# Patient Record
Sex: FEMALE | Race: WHITE | NOT HISPANIC OR LATINO | Employment: FULL TIME | URBAN - METROPOLITAN AREA
[De-identification: names, ages, dates, MRNs, and addresses within clinical notes are randomized per-mention and may not be internally consistent; named-entity substitution may affect disease eponyms.]

---

## 2017-11-20 ENCOUNTER — OFFICE VISIT (OUTPATIENT)
Dept: URGENT CARE | Facility: CLINIC | Age: 52
End: 2017-11-20
Payer: COMMERCIAL

## 2017-11-20 VITALS
OXYGEN SATURATION: 100 % | WEIGHT: 190 LBS | HEART RATE: 57 BPM | TEMPERATURE: 99 F | SYSTOLIC BLOOD PRESSURE: 110 MMHG | DIASTOLIC BLOOD PRESSURE: 64 MMHG | HEIGHT: 67 IN | BODY MASS INDEX: 29.82 KG/M2 | RESPIRATION RATE: 19 BRPM

## 2017-11-20 DIAGNOSIS — J01.40 ACUTE PANSINUSITIS, RECURRENCE NOT SPECIFIED: Primary | ICD-10-CM

## 2017-11-20 PROCEDURE — 99203 OFFICE O/P NEW LOW 30 MIN: CPT | Mod: 25,S$GLB,, | Performed by: FAMILY MEDICINE

## 2017-11-20 PROCEDURE — 96372 THER/PROPH/DIAG INJ SC/IM: CPT | Mod: S$GLB,,, | Performed by: FAMILY MEDICINE

## 2017-11-20 RX ORDER — BETAMETHASONE SODIUM PHOSPHATE AND BETAMETHASONE ACETATE 3; 3 MG/ML; MG/ML
6 INJECTION, SUSPENSION INTRA-ARTICULAR; INTRALESIONAL; INTRAMUSCULAR; SOFT TISSUE
Status: COMPLETED | OUTPATIENT
Start: 2017-11-20 | End: 2017-11-20

## 2017-11-20 RX ORDER — AMOXICILLIN AND CLAVULANATE POTASSIUM 875; 125 MG/1; MG/1
1 TABLET, FILM COATED ORAL 2 TIMES DAILY
Qty: 20 TABLET | Refills: 0 | Status: SHIPPED | OUTPATIENT
Start: 2017-11-20 | End: 2017-11-30

## 2017-11-20 RX ADMIN — BETAMETHASONE SODIUM PHOSPHATE AND BETAMETHASONE ACETATE 6 MG: 3; 3 INJECTION, SUSPENSION INTRA-ARTICULAR; INTRALESIONAL; INTRAMUSCULAR; SOFT TISSUE at 07:11

## 2017-11-21 NOTE — PATIENT INSTRUCTIONS
Consider adding over-the-counter PROBIOTICS to decrease some side-effects associated with antibiotics. When a person takes antibiotics, both the harmful bacteria and the beneficial bacteria are killed. A reduction of beneficial bacteria can lead to digestive problems, such as diarrhea, yeast infections and urinary tract infections.      Sinusitis (Antibiotic Treatment)    The sinuses are air-filled spaces within the bones of the face. They connect to the inside of the nose. Sinusitis is an inflammation of the tissue lining the sinus cavity. Sinus inflammation can occur during a cold. It can also be due to allergies to pollens and other particles in the air. Sinusitis can cause symptoms of sinus congestion and fullness. A sinus infection causes fever, headache and facial pain. There is often green or yellow drainage from the nose or into the back of the throat (post-nasal drip). You have been given antibiotics to treat this condition.  Home care:  · Take the full course of antibiotics as instructed. Do not stop taking them, even if you feel better.  · Drink plenty of water, hot tea, and other liquids. This may help thin mucus. It also may promote sinus drainage.  · Heat may help soothe painful areas of the face. Use a towel soaked in hot water. Or,  the shower and direct the hot spray onto your face. Using a vaporizer along with a menthol rub at night may also help.   · An expectorant containing guaifenesin may help thin the mucus and promote drainage from the sinuses.  · Over-the-counter decongestants may be used unless a similar medicine was prescribed. Nasal sprays work the fastest. Use one that contains phenylephrine or oxymetazoline. First blow the nose gently. Then use the spray. Do not use these medicines more often than directed on the label or symptoms may get worse. You may also use tablets containing pseudoephedrine. Avoid products that combine ingredients, because side effects may be  increased. Read labels. You can also ask the pharmacist for help. (NOTE: Persons with high blood pressure should not use decongestants. They can raise blood pressure.)  · Over-the-counter antihistamines may help if allergies contributed to your sinusitis.    · Do not use nasal rinses or irrigation during an acute sinus infection, unless told to by your health care provider. Rinsing may spread the infection to other sinuses.  · Use acetaminophen or ibuprofen to control pain, unless another pain medicine was prescribed. (If you have chronic liver or kidney disease or ever had a stomach ulcer, talk with your doctor before using these medicines. Aspirin should never be used in anyone under 18 years of age who is ill with a fever. It may cause severe liver damage.)  · Don't smoke. This can worsen symptoms.  Follow-up care  Follow up with your healthcare provider or our staff if you are not improving within the next week.  When to seek medical advice  Call your healthcare provider if any of these occur:  · Facial pain or headache becoming more severe  · Stiff neck  · Unusual drowsiness or confusion  · Swelling of the forehead or eyelids  · Vision problems, including blurred or double vision  · Fever of 100.4ºF (38ºC) or higher, or as directed by your healthcare provider  · Seizure  · Breathing problems  · Symptoms not resolving within 10 days  Date Last Reviewed: 4/13/2015  © 6516-9690 [employer]. [address] [place] [address], [place] [address]. All rights reserved. This information is not intended as a substitute for professional medical care. Always follow your healthcare professional's instructions.

## 2017-11-21 NOTE — PROGRESS NOTES
"Subjective:       Patient ID: Mikaela Oneil is a 52 y.o. female.    Vitals:  height is 5' 7" (1.702 m) and weight is 86.2 kg (190 lb). Her temperature is 98.9 °F (37.2 °C). Her blood pressure is 110/64 and her pulse is 57 (abnormal). Her respiration is 19 and oxygen saturation is 100%.     Chief Complaint: Sore Throat    Sore Throat    This is a new problem. The current episode started today. The problem has been gradually worsening. There has been no fever. The pain is at a severity of 3/10. The pain is mild. Associated symptoms include ear pain, headaches and a hoarse voice. Pertinent negatives include no abdominal pain, congestion, coughing or shortness of breath. She has tried nothing for the symptoms. The treatment provided no relief.     Review of Systems   Constitution: Positive for chills and fever. Negative for malaise/fatigue.   HENT: Positive for ear pain, hoarse voice and sore throat. Negative for congestion.    Eyes: Negative for discharge and redness.   Cardiovascular: Negative for chest pain, dyspnea on exertion and leg swelling.   Respiratory: Negative for cough, shortness of breath, sputum production and wheezing.    Musculoskeletal: Negative for myalgias.   Gastrointestinal: Negative for abdominal pain and nausea.   Neurological: Positive for headaches.       Objective:      Physical Exam   Constitutional: She is oriented to person, place, and time. She appears well-developed and well-nourished.   HENT:   Head: Normocephalic and atraumatic.   Right Ear: External ear normal.   Left Ear: External ear normal.   Nose: Mucosal edema, rhinorrhea and sinus tenderness present. Right sinus exhibits maxillary sinus tenderness and frontal sinus tenderness. Left sinus exhibits maxillary sinus tenderness and frontal sinus tenderness.   Mouth/Throat: Oropharyngeal exudate and posterior oropharyngeal erythema present.   Eyes: Conjunctivae and EOM are normal. Pupils are equal, round, and reactive to light.   Neck: " Normal range of motion.   Cardiovascular: Normal rate, regular rhythm and normal heart sounds.    Pulmonary/Chest: Effort normal and breath sounds normal. She has no wheezes. She has no rales.   Neurological: She is alert and oriented to person, place, and time.       Assessment:       1. Acute pansinusitis, recurrence not specified        Plan:         Acute pansinusitis, recurrence not specified  -     betamethasone acetate-betamethasone sodium phosphate injection 6 mg; Inject 1 mL (6 mg total) into the muscle one time.  -     amoxicillin-clavulanate 875-125mg (AUGMENTIN) 875-125 mg per tablet; Take 1 tablet by mouth 2 (two) times daily.  Dispense: 20 tablet; Refill: 0  -     (Magic mouthwash) 1:1:1 Benadryl 12.5mg/5ml liq, aluminum & magnesium hydroxide-simehticone (Maalox), lidocaine viscous 2%; Swish and spit 10 mLs every 4 (four) hours as needed. for mouth sores  Dispense: 180 mL; Refill: 0      Patient Instructions         Consider adding over-the-counter PROBIOTICS to decrease some side-effects associated with antibiotics. When a person takes antibiotics, both the harmful bacteria and the beneficial bacteria are killed. A reduction of beneficial bacteria can lead to digestive problems, such as diarrhea, yeast infections and urinary tract infections.      Sinusitis (Antibiotic Treatment)    The sinuses are air-filled spaces within the bones of the face. They connect to the inside of the nose. Sinusitis is an inflammation of the tissue lining the sinus cavity. Sinus inflammation can occur during a cold. It can also be due to allergies to pollens and other particles in the air. Sinusitis can cause symptoms of sinus congestion and fullness. A sinus infection causes fever, headache and facial pain. There is often green or yellow drainage from the nose or into the back of the throat (post-nasal drip). You have been given antibiotics to treat this condition.  Home care:  · Take the full course of antibiotics as  instructed. Do not stop taking them, even if you feel better.  · Drink plenty of water, hot tea, and other liquids. This may help thin mucus. It also may promote sinus drainage.  · Heat may help soothe painful areas of the face. Use a towel soaked in hot water. Or,  the shower and direct the hot spray onto your face. Using a vaporizer along with a menthol rub at night may also help.   · An expectorant containing guaifenesin may help thin the mucus and promote drainage from the sinuses.  · Over-the-counter decongestants may be used unless a similar medicine was prescribed. Nasal sprays work the fastest. Use one that contains phenylephrine or oxymetazoline. First blow the nose gently. Then use the spray. Do not use these medicines more often than directed on the label or symptoms may get worse. You may also use tablets containing pseudoephedrine. Avoid products that combine ingredients, because side effects may be increased. Read labels. You can also ask the pharmacist for help. (NOTE: Persons with high blood pressure should not use decongestants. They can raise blood pressure.)  · Over-the-counter antihistamines may help if allergies contributed to your sinusitis.    · Do not use nasal rinses or irrigation during an acute sinus infection, unless told to by your health care provider. Rinsing may spread the infection to other sinuses.  · Use acetaminophen or ibuprofen to control pain, unless another pain medicine was prescribed. (If you have chronic liver or kidney disease or ever had a stomach ulcer, talk with your doctor before using these medicines. Aspirin should never be used in anyone under 18 years of age who is ill with a fever. It may cause severe liver damage.)  · Don't smoke. This can worsen symptoms.  Follow-up care  Follow up with your healthcare provider or our staff if you are not improving within the next week.  When to seek medical advice  Call your healthcare provider if any of these  occur:  · Facial pain or headache becoming more severe  · Stiff neck  · Unusual drowsiness or confusion  · Swelling of the forehead or eyelids  · Vision problems, including blurred or double vision  · Fever of 100.4ºF (38ºC) or higher, or as directed by your healthcare provider  · Seizure  · Breathing problems  · Symptoms not resolving within 10 days  Date Last Reviewed: 4/13/2015  © 7579-6068 [employer]. [address] [place] [address], [place] [address]. All rights reserved. This information is not intended as a substitute for professional medical care. Always follow your healthcare professional's instructions.

## 2018-01-01 ENCOUNTER — OFFICE VISIT (OUTPATIENT)
Dept: URGENT CARE | Facility: CLINIC | Age: 53
End: 2018-01-01
Payer: COMMERCIAL

## 2018-01-01 VITALS
TEMPERATURE: 98 F | HEIGHT: 67 IN | SYSTOLIC BLOOD PRESSURE: 95 MMHG | HEART RATE: 63 BPM | DIASTOLIC BLOOD PRESSURE: 50 MMHG | WEIGHT: 190 LBS | BODY MASS INDEX: 29.82 KG/M2 | OXYGEN SATURATION: 97 %

## 2018-01-01 DIAGNOSIS — B34.9 VIRAL SYNDROME: Primary | ICD-10-CM

## 2018-01-01 LAB
CTP QC/QA: YES
FLUAV AG NPH QL: NEGATIVE
FLUBV AG NPH QL: NEGATIVE

## 2018-01-01 PROCEDURE — 87804 INFLUENZA ASSAY W/OPTIC: CPT | Mod: QW,S$GLB,, | Performed by: PHYSICIAN ASSISTANT

## 2018-01-01 PROCEDURE — 99214 OFFICE O/P EST MOD 30 MIN: CPT | Mod: 25,S$GLB,, | Performed by: PHYSICIAN ASSISTANT

## 2018-01-01 PROCEDURE — 96372 THER/PROPH/DIAG INJ SC/IM: CPT | Mod: S$GLB,,, | Performed by: EMERGENCY MEDICINE

## 2018-01-01 RX ORDER — DEXAMETHASONE SODIUM PHOSPHATE 100 MG/10ML
8 INJECTION INTRAMUSCULAR; INTRAVENOUS
Status: COMPLETED | OUTPATIENT
Start: 2018-01-01 | End: 2018-01-01

## 2018-01-01 RX ORDER — BENZONATATE 100 MG/1
100 CAPSULE ORAL 3 TIMES DAILY PRN
Qty: 30 CAPSULE | Refills: 0 | Status: SHIPPED | OUTPATIENT
Start: 2018-01-01 | End: 2019-01-01

## 2018-01-01 RX ADMIN — DEXAMETHASONE SODIUM PHOSPHATE 8 MG: 100 INJECTION INTRAMUSCULAR; INTRAVENOUS at 04:01

## 2018-01-01 NOTE — PROGRESS NOTES
"Subjective:       Patient ID: Mikaela Oneil is a 52 y.o. female.    Vitals:  height is 5' 7" (1.702 m) and weight is 86.2 kg (190 lb). Her temperature is 98 °F (36.7 °C). Her blood pressure is 95/50 (abnormal) and her pulse is 63. Her oxygen saturation is 97%.     Chief Complaint: URI    URI    This is a new problem. The current episode started yesterday. The problem has been unchanged. There has been no fever. Associated symptoms include coughing, headaches and rhinorrhea. Pertinent negatives include no abdominal pain, chest pain, congestion, ear pain, nausea, sore throat or wheezing. She has tried nothing for the symptoms.     Review of Systems   Constitution: Negative for chills, fever and malaise/fatigue.   HENT: Positive for hoarse voice and rhinorrhea. Negative for congestion, ear pain and sore throat.    Eyes: Negative for discharge and redness.   Cardiovascular: Negative for chest pain, dyspnea on exertion and leg swelling.   Respiratory: Positive for cough and sputum production. Negative for shortness of breath and wheezing.    Musculoskeletal: Negative for myalgias.   Gastrointestinal: Negative for abdominal pain and nausea.   Neurological: Positive for headaches.       Objective:      Physical Exam   Constitutional: She is oriented to person, place, and time. Vital signs are normal. She appears well-developed and well-nourished. She appears ill. No distress.   HENT:   Head: Normocephalic and atraumatic.   Right Ear: Hearing, tympanic membrane, external ear and ear canal normal.   Left Ear: Hearing, tympanic membrane, external ear and ear canal normal.   Nose: Mucosal edema present. Right sinus exhibits no maxillary sinus tenderness and no frontal sinus tenderness. Left sinus exhibits no maxillary sinus tenderness and no frontal sinus tenderness.   Mouth/Throat: Uvula is midline. Posterior oropharyngeal erythema present. No oropharyngeal exudate or posterior oropharyngeal edema.   Eyes: Conjunctivae, EOM " and lids are normal. Right eye exhibits no discharge. Left eye exhibits no discharge.   Neck: Normal range of motion. Neck supple.   Cardiovascular: Normal rate, regular rhythm and normal heart sounds.  Exam reveals no gallop and no friction rub.    No murmur heard.  Pulmonary/Chest: Effort normal and breath sounds normal. No respiratory distress. She has no decreased breath sounds. She has no wheezes. She has no rhonchi. She has no rales.   Musculoskeletal: Normal range of motion.   Lymphadenopathy:        Head (right side): No submandibular and no tonsillar adenopathy present.        Head (left side): No submandibular and no tonsillar adenopathy present.   Neurological: She is alert and oriented to person, place, and time.   Skin: Skin is warm and dry. No rash noted. No erythema.   Psychiatric: She has a normal mood and affect. Her behavior is normal.   Nursing note and vitals reviewed.      Assessment:       1. Viral syndrome        Office Visit on 01/01/2018   Component Date Value Ref Range Status    Rapid Influenza A Ag 01/01/2018 Negative  Negative Final    Rapid Influenza B Ag 01/01/2018 Negative  Negative Final     Acceptable 01/01/2018 Yes   Final     Plan:         Viral syndrome  -     POCT Influenza A/B  -     dexamethasone injection 8 mg; Inject 0.8 mLs (8 mg total) into the muscle one time.  -     benzonatate (TESSALON PERLES) 100 MG capsule; Take 1 capsule (100 mg total) by mouth 3 (three) times daily as needed for Cough.  Dispense: 30 capsule; Refill: 0      Patient Instructions     You have been diagnosed with a viral syndrome. Viral syndromes are self-limited and usually resolve within 10 days from onset of symptoms. Antibiotics are not effective against viral infections. It is important that you get lots of rest and drink plenty of fluids. Treatment is through symptomatic relief.    Below are suggestions for symptomatic relief:   -Tylenol every 4 hours OR ibuprofen every 6 hours  as needed for pain/fever.   -Salt water gargles to soothe throat pain.   -Chloroseptic spray also helps to numb throat pain.   -Nasal saline spray reduces inflammation and dryness.   -Warm face compresses to help with facial sinus pain/pressure.   -Vicks vapor rub at night.   -Flonase OTC or Nasacort OTC for nasal congestion.   -Simple foods like chicken noodle soup.   -Delsym helps with coughing at night   -Zyrtec/Claritin during the day & Benadryl at night may help with allergies.     If you DO NOT have Hypertension or any history of palpitations, it is ok to take over the counter Sudafed or Mucinex D or Allegra-D or Claritin-D or Zyrtec-D.  If you do take one of the above, it is ok to combine that with plain over the counter Mucinex or Allegra or Claritin or Zyrtec. If, for example, you are taking Zyrtec -D, you can combine that with Mucinex, but not Mucinex-D.  If you are taking Mucinex-D, you can combine that with plain Allegra or Claritin or Zyrtec.   If you DO have Hypertension or palpitations, it is safe to take Coricidin HBP for relief of sinus symptoms.    Please follow up with your primary care provider within 2-5 days if your signs and symptoms have not resolved or worsen.     If your condition worsens or fails to improve we recommend that you receive another evaluation at the emergency room immediately or contact your primary medical clinic to discuss your concerns.   You must understand that you have received an Urgent Care treatment only and that you may be released before all of your medical problems are known or treated. You, the patient, will arrange for follow up care as instructed.           Self-Care for Sinusitis     Drinking plenty of water can help sinuses drain.   Sinusitis can often be managed with self-care. Self-care can keep sinuses moist and make you feel more comfortable. Remember to follow your doctor's instructions closely. This can make a big difference in getting your sinus problem  under control.  Drink fluids  Drinking extra fluids helps thin your mucus. This lets it drain from your sinuses more easily. Have a glass of water every hour or two. A humidifier helps in much the same way. Fluids can also offset the drying effects of certain medicines. If you use a humidifier, follow the product maker's instructions on how to use it. Clean it on a regular schedule.  Use saltwater rinses  Rinses help keep your sinuses and nose moist. Mix a teaspoon of salt in 8 ounces of fresh, warm water. Use a bulb syringe to gently squirt the water into your nose a few times a day. You can also buy ready-made saline nasal sprays.  Apply hot or cold packs  Applying heat to the area surrounding your sinuses may make you feel more comfortable. Use a hot water bottle or a hand towel dipped in hot water. Some people also find ice packs effective for relieving pain.  Medicines  Your doctor may prescribe medications to help treat your sinusitis. If you have an infection, antibiotics can help clear it up. If you are prescribed antibiotics, take all pills on schedule until they are gone, even if you feel better. Decongestants help relieve swelling. Use decongestant sprays for short periods only under the direction of your doctor. If you have allergies, your doctor may prescribe medications to help relieve them.   Date Last Reviewed: 10/1/2016  © 2673-4855 [employer]. [address] [place] [address], [place] [address]. All rights reserved. This information is not intended as a substitute for professional medical care. Always follow your healthcare professional's instructions.

## 2018-01-01 NOTE — PATIENT INSTRUCTIONS
You have been diagnosed with a viral syndrome. Viral syndromes are self-limited and usually resolve within 10 days from onset of symptoms. Antibiotics are not effective against viral infections. It is important that you get lots of rest and drink plenty of fluids. Treatment is through symptomatic relief.    Below are suggestions for symptomatic relief:   -Tylenol every 4 hours OR ibuprofen every 6 hours as needed for pain/fever.   -Salt water gargles to soothe throat pain.   -Chloroseptic spray also helps to numb throat pain.   -Nasal saline spray reduces inflammation and dryness.   -Warm face compresses to help with facial sinus pain/pressure.   -Vicks vapor rub at night.   -Flonase OTC or Nasacort OTC for nasal congestion.   -Simple foods like chicken noodle soup.   -Delsym helps with coughing at night   -Zyrtec/Claritin during the day & Benadryl at night may help with allergies.     If you DO NOT have Hypertension or any history of palpitations, it is ok to take over the counter Sudafed or Mucinex D or Allegra-D or Claritin-D or Zyrtec-D.  If you do take one of the above, it is ok to combine that with plain over the counter Mucinex or Allegra or Claritin or Zyrtec. If, for example, you are taking Zyrtec -D, you can combine that with Mucinex, but not Mucinex-D.  If you are taking Mucinex-D, you can combine that with plain Allegra or Claritin or Zyrtec.   If you DO have Hypertension or palpitations, it is safe to take Coricidin HBP for relief of sinus symptoms.    Please follow up with your primary care provider within 2-5 days if your signs and symptoms have not resolved or worsen.     If your condition worsens or fails to improve we recommend that you receive another evaluation at the emergency room immediately or contact your primary medical clinic to discuss your concerns.   You must understand that you have received an Urgent Care treatment only and that you may be released before all of your medical problems  are known or treated. You, the patient, will arrange for follow up care as instructed.           Self-Care for Sinusitis     Drinking plenty of water can help sinuses drain.   Sinusitis can often be managed with self-care. Self-care can keep sinuses moist and make you feel more comfortable. Remember to follow your doctor's instructions closely. This can make a big difference in getting your sinus problem under control.  Drink fluids  Drinking extra fluids helps thin your mucus. This lets it drain from your sinuses more easily. Have a glass of water every hour or two. A humidifier helps in much the same way. Fluids can also offset the drying effects of certain medicines. If you use a humidifier, follow the product maker's instructions on how to use it. Clean it on a regular schedule.  Use saltwater rinses  Rinses help keep your sinuses and nose moist. Mix a teaspoon of salt in 8 ounces of fresh, warm water. Use a bulb syringe to gently squirt the water into your nose a few times a day. You can also buy ready-made saline nasal sprays.  Apply hot or cold packs  Applying heat to the area surrounding your sinuses may make you feel more comfortable. Use a hot water bottle or a hand towel dipped in hot water. Some people also find ice packs effective for relieving pain.  Medicines  Your doctor may prescribe medications to help treat your sinusitis. If you have an infection, antibiotics can help clear it up. If you are prescribed antibiotics, take all pills on schedule until they are gone, even if you feel better. Decongestants help relieve swelling. Use decongestant sprays for short periods only under the direction of your doctor. If you have allergies, your doctor may prescribe medications to help relieve them.   Date Last Reviewed: 10/1/2016  © 6253-0514 [employer]. [address] [place] [address], [place] [address]. All rights reserved. This information is not intended as a substitute for professional medical  care. Always follow your healthcare professional's instructions.

## 2018-09-05 ENCOUNTER — OFFICE VISIT (OUTPATIENT)
Dept: URGENT CARE | Facility: CLINIC | Age: 53
End: 2018-09-05
Payer: COMMERCIAL

## 2018-09-05 VITALS
TEMPERATURE: 101 F | HEIGHT: 67 IN | DIASTOLIC BLOOD PRESSURE: 77 MMHG | HEART RATE: 89 BPM | OXYGEN SATURATION: 99 % | RESPIRATION RATE: 20 BRPM | WEIGHT: 185 LBS | BODY MASS INDEX: 29.03 KG/M2 | SYSTOLIC BLOOD PRESSURE: 124 MMHG

## 2018-09-05 DIAGNOSIS — J01.10 ACUTE NON-RECURRENT FRONTAL SINUSITIS: ICD-10-CM

## 2018-09-05 DIAGNOSIS — J02.9 SORE THROAT: ICD-10-CM

## 2018-09-05 DIAGNOSIS — J03.90 TONSILLITIS: Primary | ICD-10-CM

## 2018-09-05 DIAGNOSIS — R50.9 FEVER, UNSPECIFIED FEVER CAUSE: ICD-10-CM

## 2018-09-05 LAB
CTP QC/QA: YES
CTP QC/QA: YES
FLUAV AG NPH QL: NEGATIVE
FLUBV AG NPH QL: NEGATIVE
S PYO RRNA THROAT QL PROBE: NEGATIVE

## 2018-09-05 PROCEDURE — 99214 OFFICE O/P EST MOD 30 MIN: CPT | Mod: S$GLB,,, | Performed by: NURSE PRACTITIONER

## 2018-09-05 PROCEDURE — 87880 STREP A ASSAY W/OPTIC: CPT | Mod: QW,S$GLB,, | Performed by: NURSE PRACTITIONER

## 2018-09-05 PROCEDURE — 87804 INFLUENZA ASSAY W/OPTIC: CPT | Mod: QW,S$GLB,, | Performed by: NURSE PRACTITIONER

## 2018-09-05 RX ORDER — PREDNISONE 20 MG/1
40 TABLET ORAL DAILY
Qty: 10 TABLET | Refills: 0 | Status: SHIPPED | OUTPATIENT
Start: 2018-09-05 | End: 2018-09-10

## 2018-09-05 RX ORDER — AMOXICILLIN AND CLAVULANATE POTASSIUM 875; 125 MG/1; MG/1
1 TABLET, FILM COATED ORAL 2 TIMES DAILY
Qty: 20 TABLET | Refills: 0 | Status: SHIPPED | OUTPATIENT
Start: 2018-09-05 | End: 2018-11-29

## 2018-09-05 NOTE — LETTER
September 5, 2018      [facility] [address], [address]  [place] [address]  Phone: [phone]  Fax: [phone]       Patient: Mikaela Oenil   YOB: 1965  Date of Visit: 09/05/2018    To Whom It May Concern:    Ninfa Oneil  was at [facility] on 09/05/2018. She may return to work/school on 09/07/2018 with no restrictions. If you have any questions or concerns, or if I can be of further assistance, please do not hesitate to contact me.    Sincerely,        France Garza NP

## 2018-09-05 NOTE — PROGRESS NOTES
"Subjective:       Patient ID: Mikaela Oneil is a 53 y.o. female.    Vitals:  height is 5' 7" (1.702 m) and weight is 83.9 kg (185 lb). Her temperature is 101.4 °F (38.6 °C) (abnormal). Her blood pressure is 124/77 and her pulse is 89. Her respiration is 20 and oxygen saturation is 99%.     Chief Complaint: Fever; Chills; and Sore Throat    Pt has been having a sore throat, fever and chills since Sunday night. She has taken mucin ex and ibuprofen and that did help but her fever kept coming back.      Fever    This is a new problem. Episode onset: 4 days ago. The problem occurs constantly. The problem has been unchanged. The maximum temperature noted was 102 to 102.9 F. Associated symptoms include muscle aches and a sore throat. Pertinent negatives include no abdominal pain, chest pain, congestion, coughing, ear pain, headaches, nausea or wheezing. She has tried NSAIDs for the symptoms. The treatment provided mild relief.   Sore Throat    This is a new problem. Episode onset: 4 days ago. The problem has been unchanged. The maximum temperature recorded prior to her arrival was 102 - 102.9 F. The fever has been present for 1 to 2 days. Pertinent negatives include no abdominal pain, congestion, coughing, ear pain, headaches, hoarse voice or shortness of breath. She has tried NSAIDs for the symptoms. The treatment provided mild relief.     Review of Systems   Constitution: Positive for chills and fever. Negative for malaise/fatigue.   HENT: Positive for sore throat. Negative for congestion, ear pain and hoarse voice.    Eyes: Negative for discharge and redness.   Cardiovascular: Negative for chest pain, dyspnea on exertion and leg swelling.   Respiratory: Negative for cough, shortness of breath, sputum production and wheezing.    Musculoskeletal: Negative for myalgias.   Gastrointestinal: Negative for abdominal pain and nausea.   Neurological: Negative for headaches.       Objective:      Physical Exam   Constitutional: " She is oriented to person, place, and time. She appears well-developed and well-nourished. She is cooperative.  Non-toxic appearance. She does not appear ill. No distress.   HENT:   Head: Normocephalic and atraumatic.   Right Ear: Hearing, tympanic membrane, external ear and ear canal normal.   Left Ear: Hearing, tympanic membrane, external ear and ear canal normal.   Nose: No mucosal edema, rhinorrhea or nasal deformity. No epistaxis. Right sinus exhibits frontal sinus tenderness. Right sinus exhibits no maxillary sinus tenderness. Left sinus exhibits frontal sinus tenderness. Left sinus exhibits no maxillary sinus tenderness.   Mouth/Throat: Uvula is midline and mucous membranes are normal. No trismus in the jaw. Normal dentition. No dental abscesses or uvula swelling. Oropharyngeal exudate, posterior oropharyngeal edema and posterior oropharyngeal erythema present. No tonsillar abscesses. Tonsils are 3+ on the right. Tonsils are 3+ on the left. Tonsillar exudate.   Eyes: Conjunctivae and lids are normal. No scleral icterus.   Sclera clear bilat   Neck: Trachea normal, full passive range of motion without pain and phonation normal. Neck supple.   Cardiovascular: Normal rate, regular rhythm, normal heart sounds, intact distal pulses and normal pulses.   Pulmonary/Chest: Effort normal and breath sounds normal. No respiratory distress.   Abdominal: Soft. Normal appearance and bowel sounds are normal. She exhibits no distension. There is no tenderness.   Musculoskeletal: Normal range of motion. She exhibits no edema or deformity.   Lymphadenopathy:        Head (right side): Submandibular adenopathy present.        Head (left side): Submandibular (tender) adenopathy present.   Neurological: She is alert and oriented to person, place, and time. She exhibits normal muscle tone. Coordination normal.   Skin: Skin is warm, dry and intact. She is not diaphoretic. No pallor.   Psychiatric: She has a normal mood and affect.  Her speech is normal and behavior is normal. Judgment and thought content normal. Cognition and memory are normal.   Nursing note and vitals reviewed.        Results for orders placed or performed in visit on 09/05/18   POCT rapid strep A   Result Value Ref Range    Rapid Strep A Screen Negative Negative     Acceptable Yes    POCT Influenza A/B   Result Value Ref Range    Rapid Influenza A Ag Negative Negative    Rapid Influenza B Ag Negative Negative     Acceptable Yes        Assessment:       1. Tonsillitis    2. Acute non-recurrent frontal sinusitis    3. Fever, unspecified fever cause    4. Sore throat        Plan:         Dr. Bosch also evaluated patient and is in agreement with current plan of care and regimen.    Tonsillitis    Acute non-recurrent frontal sinusitis  -     predniSONE (DELTASONE) 20 MG tablet; Take 2 tablets (40 mg total) by mouth once daily. for 5 days  Dispense: 10 tablet; Refill: 0  -     amoxicillin-clavulanate 875-125mg (AUGMENTIN) 875-125 mg per tablet; Take 1 tablet by mouth 2 (two) times daily.  Dispense: 20 tablet; Refill: 0    Fever, unspecified fever cause  -     POCT Influenza A/B    Sore throat  -     POCT rapid strep A  -     (Magic mouthwash) 1:1:1 Benadryl 12.5mg/5ml liq, aluminum & magnesium hydroxide-simehticone (Maalox), lidocaine viscous 2%; Swish and spit 10 mLs every 4 (four) hours as needed. for mouth sores  Dispense: 200 mL; Refill: 0            Patient Instructions     If symptoms do not improve within 2-3 days of antibiotic therapy return to clinic. If you begin to have difficulty swallowing, pain more so on one side of throat, muffled voice or drooling go to ER immediately. Complete full course of antibiotic therapy.     Continue taking Motrin as needed for fevers and aches.     Please follow up with your Primary care provider within 2-5 days if your signs and symptoms have not resolved or worsen.     If your condition worsens or fails to  improve we recommend that you receive another evaluation at the emergency room immediately or contact your primary medical clinic to discuss your concerns.   You must understand that you have received an Urgent Care treatment only and that you may be released before all of your medical problems are known or treated. You, the patient, will arrange for follow up care as instructed.       Self-Care for Sore Throats    Sore throats happen for many reasons, such as colds, allergies, and infections caused by viruses or bacteria. In any case, your throat becomes red and sore. Your goal for self-care is to reduce your discomfort while giving your throat a chance to heal.  Moisten and soothe your throat  Tips include the following:  · Try a sip of water first thing after waking up.  · Keep your throat moist by drinking 6 or more glasses of clear liquids every day.  · Run a cool-air humidifier in your room overnight.  · Avoid cigarette smoke.   · Suck on throat lozenges, cough drops, hard candy, ice chips, or frozen fruit-juice bars. Use the sugar-free versions if your diet or medical condition requires them.  Gargle to ease irritation  Gargling every hour or 2 can ease irritation. Try gargling with 1 of these solutions:  · 1/4 teaspoon of salt in 1/2 cup of warm water  · An over-the-counter anesthetic gargle  Use medicine for more relief  Over-the-counter medicine can reduce sore throat symptoms. Ask your pharmacist if you have questions about which medicine to use:  · Ease pain with anesthetic sprays. Aspirin or an aspirin substitute also helps. Remember, never give aspirin to anyone 18 or younger, or if you are already taking blood thinners.   · For sore throats caused by allergies, try antihistamines to block the allergic reaction.  · Remember: unless a sore throat is caused by a bacterial infection, antibiotics wont help you.  Prevent future sore throats  Prevention tips include the following:  · Stop smoking or reduce  contact with secondhand smoke. Smoke irritates the tender throat lining.  · Limit contact with pets and with allergy-causing substances, such as pollen and mold.  · When youre around someone with a sore throat or cold, wash your hands often to keep viruses or bacteria from spreading.  · Dont strain your vocal cords.  Call your healthcare provider  Contact your healthcare provider if you have:  · A temperature over 101°F (38.3°C)  · White spots on the throat  · Great difficulty swallowing  · Trouble breathing  · A skin rash  · Recent exposure to someone else with strep bacteria  · Severe hoarseness and swollen glands in the neck or jaw   Date Last Reviewed: 8/1/2016  © 9224-1725 [employer]. [address] [place] [address], [place] [address]. All rights reserved. This information is not intended as a substitute for professional medical care. Always follow your healthcare professional's instructions.

## 2018-09-05 NOTE — PATIENT INSTRUCTIONS
If symptoms do not improve within 2-3 days of antibiotic therapy return to clinic. If you begin to have difficulty swallowing, pain more so on one side of throat, muffled voice or drooling go to ER immediately. Complete full course of antibiotic therapy.     Continue taking Motrin as needed for fevers and aches.     Please follow up with your Primary care provider within 2-5 days if your signs and symptoms have not resolved or worsen.     If your condition worsens or fails to improve we recommend that you receive another evaluation at the emergency room immediately or contact your primary medical clinic to discuss your concerns.   You must understand that you have received an Urgent Care treatment only and that you may be released before all of your medical problems are known or treated. You, the patient, will arrange for follow up care as instructed.       Self-Care for Sore Throats    Sore throats happen for many reasons, such as colds, allergies, and infections caused by viruses or bacteria. In any case, your throat becomes red and sore. Your goal for self-care is to reduce your discomfort while giving your throat a chance to heal.  Moisten and soothe your throat  Tips include the following:  · Try a sip of water first thing after waking up.  · Keep your throat moist by drinking 6 or more glasses of clear liquids every day.  · Run a cool-air humidifier in your room overnight.  · Avoid cigarette smoke.   · Suck on throat lozenges, cough drops, hard candy, ice chips, or frozen fruit-juice bars. Use the sugar-free versions if your diet or medical condition requires them.  Gargle to ease irritation  Gargling every hour or 2 can ease irritation. Try gargling with 1 of these solutions:  · 1/4 teaspoon of salt in 1/2 cup of warm water  · An over-the-counter anesthetic gargle  Use medicine for more relief  Over-the-counter medicine can reduce sore throat symptoms. Ask your pharmacist if you have questions about which  medicine to use:  · Ease pain with anesthetic sprays. Aspirin or an aspirin substitute also helps. Remember, never give aspirin to anyone 18 or younger, or if you are already taking blood thinners.   · For sore throats caused by allergies, try antihistamines to block the allergic reaction.  · Remember: unless a sore throat is caused by a bacterial infection, antibiotics wont help you.  Prevent future sore throats  Prevention tips include the following:  · Stop smoking or reduce contact with secondhand smoke. Smoke irritates the tender throat lining.  · Limit contact with pets and with allergy-causing substances, such as pollen and mold.  · When youre around someone with a sore throat or cold, wash your hands often to keep viruses or bacteria from spreading.  · Dont strain your vocal cords.  Call your healthcare provider  Contact your healthcare provider if you have:  · A temperature over 101°F (38.3°C)  · White spots on the throat  · Great difficulty swallowing  · Trouble breathing  · A skin rash  · Recent exposure to someone else with strep bacteria  · Severe hoarseness and swollen glands in the neck or jaw   Date Last Reviewed: 8/1/2016  © 7292-0519 [employer]. [address] [place] [address], [place] [address]. All rights reserved. This information is not intended as a substitute for professional medical care. Always follow your healthcare professional's instructions.

## 2018-11-29 ENCOUNTER — HOSPITAL ENCOUNTER (EMERGENCY)
Facility: HOSPITAL | Age: 53
Discharge: HOME | End: 2018-11-29
Attending: EMERGENCY MEDICINE
Payer: COMMERCIAL

## 2018-11-29 VITALS
BODY MASS INDEX: 27.32 KG/M2 | HEIGHT: 66 IN | HEART RATE: 56 BPM | TEMPERATURE: 99 F | RESPIRATION RATE: 13 BRPM | SYSTOLIC BLOOD PRESSURE: 137 MMHG | WEIGHT: 170 LBS | OXYGEN SATURATION: 100 % | DIASTOLIC BLOOD PRESSURE: 73 MMHG

## 2018-11-29 DIAGNOSIS — R07.89 CHEST WALL PAIN: ICD-10-CM

## 2018-11-29 DIAGNOSIS — J01.00 ACUTE MAXILLARY SINUSITIS, RECURRENCE NOT SPECIFIED: Primary | ICD-10-CM

## 2018-11-29 DIAGNOSIS — M79.18 MUSCULOSKELETAL PAIN: ICD-10-CM

## 2018-11-29 LAB
ALBUMIN SERPL-MCNC: 3.9 G/DL (ref 3.3–5.5)
ALP SERPL-CCNC: 89 U/L (ref 42–141)
B-HCG UR QL: NEGATIVE
BILIRUB SERPL-MCNC: 0.7 MG/DL (ref 0.2–1.6)
BILIRUBIN, POC UA: NEGATIVE
BLOOD, POC UA: ABNORMAL
BUN SERPL-MCNC: 11 MG/DL (ref 7–22)
CALCIUM SERPL-MCNC: 9.5 MG/DL (ref 8–10.3)
CHLORIDE SERPL-SCNC: 99 MMOL/L (ref 98–108)
CLARITY, POC UA: CLEAR
COLOR, POC UA: YELLOW
CREAT SERPL-MCNC: 0.9 MG/DL (ref 0.6–1.2)
CTP QC/QA: YES
GLUCOSE SERPL-MCNC: 94 MG/DL (ref 73–118)
GLUCOSE, POC UA: NEGATIVE
HCO3 UR-SCNC: 29.8 MMOL/L (ref 24–28)
KETONES, POC UA: NEGATIVE
LDH SERPL L TO P-CCNC: 1.22 MMOL/L (ref 0.5–2.2)
LEUKOCYTE EST, POC UA: NEGATIVE
NITRITE, POC UA: NEGATIVE
PCO2 BLDA: 53 MMHG (ref 35–45)
PH SMN: 7.36 [PH] (ref 7.35–7.45)
PH UR STRIP: 7.5 [PH]
PO2 BLDA: 18 MMHG (ref 40–60)
POC ALT (SGPT): 14 U/L (ref 10–47)
POC AST (SGOT): 19 U/L (ref 11–38)
POC BE: 3 MMOL/L
POC CARDIAC TROPONIN I: 0 NG/ML
POC SATURATED O2: 25 % (ref 95–100)
POC TCO2: 31 MMOL/L (ref 18–33)
POC TCO2: 31 MMOL/L (ref 24–29)
POTASSIUM BLD-SCNC: 3.6 MMOL/L (ref 3.6–5.1)
PROTEIN, POC UA: NEGATIVE
PROTEIN, POC: 7.5 G/DL (ref 6.4–8.1)
SAMPLE: ABNORMAL
SAMPLE: NORMAL
SODIUM BLD-SCNC: 142 MMOL/L (ref 128–145)
SPECIFIC GRAVITY, POC UA: 1.01
UROBILINOGEN, POC UA: 1 E.U./DL

## 2018-11-29 PROCEDURE — 81025 URINE PREGNANCY TEST: CPT | Performed by: EMERGENCY MEDICINE

## 2018-11-29 PROCEDURE — 96374 THER/PROPH/DIAG INJ IV PUSH: CPT

## 2018-11-29 PROCEDURE — 63600175 PHARM REV CODE 636 W HCPCS: Performed by: EMERGENCY MEDICINE

## 2018-11-29 PROCEDURE — 93010 ELECTROCARDIOGRAM REPORT: CPT | Mod: ,,, | Performed by: INTERNAL MEDICINE

## 2018-11-29 PROCEDURE — 93005 ELECTROCARDIOGRAM TRACING: CPT

## 2018-11-29 PROCEDURE — 82803 BLOOD GASES ANY COMBINATION: CPT

## 2018-11-29 PROCEDURE — 84484 ASSAY OF TROPONIN QUANT: CPT

## 2018-11-29 PROCEDURE — 81003 URINALYSIS AUTO W/O SCOPE: CPT

## 2018-11-29 PROCEDURE — 99284 EMERGENCY DEPT VISIT MOD MDM: CPT | Mod: 25

## 2018-11-29 PROCEDURE — 85025 COMPLETE CBC W/AUTO DIFF WBC: CPT

## 2018-11-29 PROCEDURE — 80053 COMPREHEN METABOLIC PANEL: CPT

## 2018-11-29 PROCEDURE — 25000003 PHARM REV CODE 250: Performed by: EMERGENCY MEDICINE

## 2018-11-29 RX ORDER — METHOCARBAMOL 500 MG/1
1000 TABLET, FILM COATED ORAL
Status: COMPLETED | OUTPATIENT
Start: 2018-11-29 | End: 2018-11-29

## 2018-11-29 RX ORDER — METHOCARBAMOL 750 MG/1
1500 TABLET, FILM COATED ORAL 3 TIMES DAILY PRN
Qty: 30 TABLET | Refills: 0 | Status: SHIPPED | OUTPATIENT
Start: 2018-11-29 | End: 2018-12-04

## 2018-11-29 RX ORDER — KETOROLAC TROMETHAMINE 30 MG/ML
15 INJECTION, SOLUTION INTRAMUSCULAR; INTRAVENOUS
Status: COMPLETED | OUTPATIENT
Start: 2018-11-29 | End: 2018-11-29

## 2018-11-29 RX ORDER — FLUTICASONE PROPIONATE 50 MCG
2 SPRAY, SUSPENSION (ML) NASAL DAILY
Qty: 1 BOTTLE | Refills: 0 | Status: SHIPPED | OUTPATIENT
Start: 2018-11-29 | End: 2023-01-18

## 2018-11-29 RX ORDER — METHYLPREDNISOLONE 4 MG/1
TABLET ORAL
Qty: 1 PACKAGE | Refills: 0 | Status: SHIPPED | OUTPATIENT
Start: 2018-11-29 | End: 2023-01-18

## 2018-11-29 RX ORDER — NAPROXEN 375 MG/1
375 TABLET ORAL 2 TIMES DAILY PRN
Qty: 20 TABLET | Refills: 0 | Status: SHIPPED | OUTPATIENT
Start: 2018-11-29 | End: 2018-12-04

## 2018-11-29 RX ORDER — AMOXICILLIN AND CLAVULANATE POTASSIUM 875; 125 MG/1; MG/1
1 TABLET, FILM COATED ORAL 2 TIMES DAILY
Qty: 20 TABLET | Refills: 0 | Status: SHIPPED | OUTPATIENT
Start: 2018-11-29 | End: 2018-12-09

## 2018-11-29 RX ORDER — BENZONATATE 100 MG/1
200 CAPSULE ORAL
Status: COMPLETED | OUTPATIENT
Start: 2018-11-29 | End: 2018-11-29

## 2018-11-29 RX ADMIN — BENZONATATE 200 MG: 100 CAPSULE ORAL at 05:11

## 2018-11-29 RX ADMIN — METHOCARBAMOL 1000 MG: 500 TABLET ORAL at 07:11

## 2018-11-29 RX ADMIN — KETOROLAC TROMETHAMINE 15 MG: 30 INJECTION, SOLUTION INTRAMUSCULAR at 05:11

## 2018-11-29 NOTE — ED PROVIDER NOTES
"Encounter Date: 11/29/2018    SCRIBE #1 NOTE: I, Allan Jones, am scribing for, and in the presence of,  Dr. Arzola. I have scribed the following portions of the note - Other sections scribed: HPI, ROS, PE.       History     Chief Complaint   Patient presents with    Muscle Pain     Patient stated she has been cleaning alot and feels like she pulled a "muscle in left chest " radiating to left neck and arm     This is a 53 y.o. female who presents to the ED with a complaint of pain to her left chest that radiates to the left neck and arm that began yesterday at 5:30 p.m. Pt states that she began feeling this pain yesterday while cleaning her son's house, but denies any trauma or injury. Pt states that leaning forward and laying back worsens her pain. Moving her arm does not worsen her pain. Nothing provides relief of her pain. She has not taken pain medication. She denies fever, chills, leg swelling or pain, or SOB. Pt a productive green cough, rhinorrhea, congestion, post nasal drip, and other allergy symptoms. Pt has been taking an antihistamine for her allergy symptoms. Pt has a history of PNA. She denies tobacco, alcohol, or illicit drug use. Pt reports of FHx of heart problems with her grandfather.      The history is provided by the patient.     Review of patient's allergies indicates:  No Known Allergies  History reviewed. No pertinent past medical history.  Past Surgical History:   Procedure Laterality Date    gastric sleeve      HYSTERECTOMY      THYROID SURGERY       History reviewed. No pertinent family history.  Social History     Tobacco Use    Smoking status: Never Smoker    Smokeless tobacco: Never Used   Substance Use Topics    Alcohol use: No     Frequency: Never    Drug use: No     Review of Systems   Constitutional: Negative for chills and fever.   HENT: Positive for congestion, postnasal drip and rhinorrhea. Negative for sore throat.    Respiratory: Positive for cough. Negative for " shortness of breath.    Cardiovascular: Positive for chest pain. Negative for leg swelling.   Gastrointestinal: Negative for nausea.   Genitourinary: Negative for dysuria.   Musculoskeletal: Positive for neck pain. Negative for back pain.   Skin: Negative for rash.   Neurological: Negative for weakness.   Hematological: Does not bruise/bleed easily.   All other systems reviewed and are negative.      Physical Exam     Initial Vitals [11/29/18 1609]   BP Pulse Resp Temp SpO2   (!) 170/74 62 20 98.5 °F (36.9 °C) 100 %      MAP       --         The patient granted permission for examination.     Physical Exam    Nursing note and vitals reviewed.  Constitutional: She appears well-developed and well-nourished.   HENT:   Head: Normocephalic and atraumatic.   Right Ear: Tympanic membrane and external ear normal.   Left Ear: Tympanic membrane and external ear normal.   Nose: Mucosal edema and rhinorrhea (Clear) present. Right sinus exhibits maxillary sinus tenderness. Right sinus exhibits no frontal sinus tenderness. Left sinus exhibits maxillary sinus tenderness. Left sinus exhibits no frontal sinus tenderness.   Mouth/Throat: Uvula is midline and mucous membranes are normal. Posterior oropharyngeal erythema (Mild) present. No oropharyngeal exudate or posterior oropharyngeal edema.   Post nasal drip noted   Eyes: Conjunctivae are normal.   Neck: Normal range of motion. Neck supple. No muscular tenderness present.   Cardiovascular: Normal rate, regular rhythm, normal heart sounds and intact distal pulses. Exam reveals no gallop and no friction rub.    No murmur heard.  Pulmonary/Chest: Breath sounds normal. No respiratory distress. She has no wheezes. She has no rhonchi. She has no rales. She exhibits tenderness.   Abdominal: Soft. Bowel sounds are normal. She exhibits no distension and no mass. There is no tenderness. There is no rebound and no guarding.   Musculoskeletal: Normal range of motion.        Right lower leg:  She exhibits no swelling and no edema.        Left lower leg: She exhibits no swelling and no edema.   Lymphadenopathy:     She has no cervical adenopathy.   Neurological: She is alert and oriented to person, place, and time.   Skin: Skin is warm and dry. Capillary refill takes less than 2 seconds. No rash noted.   Psychiatric: She has a normal mood and affect. Her behavior is normal.         ED Course   Procedures  Labs Reviewed   POCT URINALYSIS W/O SCOPE - Abnormal; Notable for the following components:       Result Value    Glucose, UA Negative (*)     Bilirubin, UA Negative (*)     Ketones, UA Negative (*)     Blood, UA Trace-lysed (*)     Protein, UA Negative (*)     Nitrite, UA Negative (*)     Leukocytes, UA Negative (*)     All other components within normal limits   ISTAT PROCEDURE - Abnormal; Notable for the following components:    POC PCO2 53.0 (*)     POC PO2 18 (*)     POC HCO3 29.8 (*)     POC SATURATED O2 25 (*)     POC TCO2 31 (*)     All other components within normal limits   TROPONIN ISTAT   POCT URINE PREGNANCY   POCT CBC   POCT URINALYSIS W/O SCOPE   POCT CMP   POCT TROPONIN   POCT CMP     EKG Readings: (Independently Interpreted)   Initial Reading: No STEMI. Rhythm: Normal Sinus Rhythm. Heart Rate: 60. Axis: Normal. Other Impression: Normal EKG, QTC normal at 400, There is no prior EKG for comparison        Imaging Results          X-Ray Chest PA And Lateral (Final result)  Result time 11/29/18 18:27:09    Final result by Guy Stveenson MD (11/29/18 18:27:09)                 Impression:      As above.      Electronically signed by: Guy Stevenson MD  Date:    11/29/2018  Time:    18:27             Narrative:    EXAMINATION:  XR CHEST PA AND LATERAL    CLINICAL HISTORY:  cough;    TECHNIQUE:  PA and lateral views of the chest were performed.    FINDINGS:  The lungs are clear.  There is no pneumothorax or pleural fluid.  The cardiac silhouette is not enlarged.  The osseous structures are  unremarkable.                                 Medical Decision Making:   Clinical Tests:   Lab Tests: Ordered and Reviewed       <> Summary of Lab: UPT is negative.    Radiological Study: Ordered  Medical Tests: Ordered    Treatment in the ED Physical Exam, Toradol, Tessalon Perle, and Robaxin  Patient reports feeling much better after medication.    Discussed labs, and imaging results.  Patient presents with chest wall pain for greater than 24 hours.  Troponin is negative.  No STEMI on EKG  and no acute issues on chest x-ray. so  chest pain unlikely to be cardiac in origin.   Fill and take prescriptions as directed.  Return to the ED if symptoms worsen or do not resolve.   Answered questions and discussed discharge plan.    Patient feels much better and is ready for discharge.  Follow up with PCP in 1 days.            Scribe Attestation:   Scribe #1: I performed the above scribed service and the documentation accurately describes the services I performed. I attest to the accuracy of the note.     I, Dr. Aruna Arzola, personally performed the services described in this documentation. This document was produced by a scribe under my direction and in my presence. All medical record entries made by the scribe were at my direction and in my presence.  I have reviewed the chart and agree that the record reflects my personal performance and is accurate and complete. Aruna Arzola DO.     11/29/2018 7:16 PM             Clinical Impression:     1. Acute maxillary sinusitis, recurrence not specified    2. Chest wall pain    3. Musculoskeletal pain                                   Aruna Arzola DO  11/29/18 1918

## 2021-12-11 ENCOUNTER — OFFICE VISIT (OUTPATIENT)
Dept: URGENT CARE | Facility: CLINIC | Age: 56
End: 2021-12-11
Payer: COMMERCIAL

## 2021-12-11 VITALS
SYSTOLIC BLOOD PRESSURE: 114 MMHG | TEMPERATURE: 99 F | OXYGEN SATURATION: 96 % | DIASTOLIC BLOOD PRESSURE: 75 MMHG | BODY MASS INDEX: 32.96 KG/M2 | WEIGHT: 210 LBS | HEART RATE: 74 BPM | HEIGHT: 67 IN

## 2021-12-11 DIAGNOSIS — J01.00 ACUTE MAXILLARY SINUSITIS, RECURRENCE NOT SPECIFIED: Primary | ICD-10-CM

## 2021-12-11 LAB
CTP QC/QA: YES
SARS-COV-2 RDRP RESP QL NAA+PROBE: NEGATIVE

## 2021-12-11 PROCEDURE — U0002 COVID-19 LAB TEST NON-CDC: HCPCS | Mod: QW,S$GLB,, | Performed by: FAMILY MEDICINE

## 2021-12-11 PROCEDURE — U0002: ICD-10-PCS | Mod: QW,S$GLB,, | Performed by: FAMILY MEDICINE

## 2021-12-11 PROCEDURE — 99214 OFFICE O/P EST MOD 30 MIN: CPT | Mod: S$GLB,,, | Performed by: FAMILY MEDICINE

## 2021-12-11 PROCEDURE — 99214 PR OFFICE/OUTPT VISIT, EST, LEVL IV, 30-39 MIN: ICD-10-PCS | Mod: S$GLB,,, | Performed by: FAMILY MEDICINE

## 2021-12-11 RX ORDER — FLUTICASONE PROPIONATE 50 MCG
1 SPRAY, SUSPENSION (ML) NASAL DAILY
Qty: 9.9 ML | Refills: 0 | Status: SHIPPED | OUTPATIENT
Start: 2021-12-11 | End: 2023-01-18

## 2021-12-11 RX ORDER — AMOXICILLIN AND CLAVULANATE POTASSIUM 875; 125 MG/1; MG/1
1 TABLET, FILM COATED ORAL 2 TIMES DAILY
Qty: 20 TABLET | Refills: 0 | Status: SHIPPED | OUTPATIENT
Start: 2021-12-11 | End: 2021-12-21

## 2022-05-26 ENCOUNTER — OFFICE VISIT (OUTPATIENT)
Dept: URGENT CARE | Facility: CLINIC | Age: 57
End: 2022-05-26
Payer: COMMERCIAL

## 2022-05-26 VITALS
TEMPERATURE: 100 F | OXYGEN SATURATION: 97 % | DIASTOLIC BLOOD PRESSURE: 83 MMHG | WEIGHT: 210 LBS | BODY MASS INDEX: 32.96 KG/M2 | HEIGHT: 67 IN | HEART RATE: 85 BPM | RESPIRATION RATE: 16 BRPM | SYSTOLIC BLOOD PRESSURE: 147 MMHG

## 2022-05-26 DIAGNOSIS — J10.1 INFLUENZA A: Primary | ICD-10-CM

## 2022-05-26 DIAGNOSIS — R05.9 COUGH: ICD-10-CM

## 2022-05-26 LAB
CTP QC/QA: YES
CTP QC/QA: YES
POC MOLECULAR INFLUENZA A AGN: POSITIVE
POC MOLECULAR INFLUENZA B AGN: NEGATIVE
SARS-COV-2 RDRP RESP QL NAA+PROBE: NEGATIVE

## 2022-05-26 PROCEDURE — 87502 POCT INFLUENZA A/B MOLECULAR: ICD-10-PCS | Mod: QW,S$GLB,, | Performed by: NURSE PRACTITIONER

## 2022-05-26 PROCEDURE — 3079F PR MOST RECENT DIASTOLIC BLOOD PRESSURE 80-89 MM HG: ICD-10-PCS | Mod: CPTII,S$GLB,, | Performed by: NURSE PRACTITIONER

## 2022-05-26 PROCEDURE — 3079F DIAST BP 80-89 MM HG: CPT | Mod: CPTII,S$GLB,, | Performed by: NURSE PRACTITIONER

## 2022-05-26 PROCEDURE — 99213 PR OFFICE/OUTPT VISIT, EST, LEVL III, 20-29 MIN: ICD-10-PCS | Mod: S$GLB,,, | Performed by: NURSE PRACTITIONER

## 2022-05-26 PROCEDURE — 1160F PR REVIEW ALL MEDS BY PRESCRIBER/CLIN PHARMACIST DOCUMENTED: ICD-10-PCS | Mod: CPTII,S$GLB,, | Performed by: NURSE PRACTITIONER

## 2022-05-26 PROCEDURE — 3077F PR MOST RECENT SYSTOLIC BLOOD PRESSURE >= 140 MM HG: ICD-10-PCS | Mod: CPTII,S$GLB,, | Performed by: NURSE PRACTITIONER

## 2022-05-26 PROCEDURE — 1160F RVW MEDS BY RX/DR IN RCRD: CPT | Mod: CPTII,S$GLB,, | Performed by: NURSE PRACTITIONER

## 2022-05-26 PROCEDURE — 1159F PR MEDICATION LIST DOCUMENTED IN MEDICAL RECORD: ICD-10-PCS | Mod: CPTII,S$GLB,, | Performed by: NURSE PRACTITIONER

## 2022-05-26 PROCEDURE — U0002: ICD-10-PCS | Mod: QW,S$GLB,, | Performed by: NURSE PRACTITIONER

## 2022-05-26 PROCEDURE — 87502 INFLUENZA DNA AMP PROBE: CPT | Mod: QW,S$GLB,, | Performed by: NURSE PRACTITIONER

## 2022-05-26 PROCEDURE — 3008F BODY MASS INDEX DOCD: CPT | Mod: CPTII,S$GLB,, | Performed by: NURSE PRACTITIONER

## 2022-05-26 PROCEDURE — 3008F PR BODY MASS INDEX (BMI) DOCUMENTED: ICD-10-PCS | Mod: CPTII,S$GLB,, | Performed by: NURSE PRACTITIONER

## 2022-05-26 PROCEDURE — 1159F MED LIST DOCD IN RCRD: CPT | Mod: CPTII,S$GLB,, | Performed by: NURSE PRACTITIONER

## 2022-05-26 PROCEDURE — U0002 COVID-19 LAB TEST NON-CDC: HCPCS | Mod: QW,S$GLB,, | Performed by: NURSE PRACTITIONER

## 2022-05-26 PROCEDURE — 99213 OFFICE O/P EST LOW 20 MIN: CPT | Mod: S$GLB,,, | Performed by: NURSE PRACTITIONER

## 2022-05-26 PROCEDURE — 3077F SYST BP >= 140 MM HG: CPT | Mod: CPTII,S$GLB,, | Performed by: NURSE PRACTITIONER

## 2022-05-26 RX ORDER — OSELTAMIVIR PHOSPHATE 75 MG/1
75 CAPSULE ORAL 2 TIMES DAILY
Qty: 10 CAPSULE | Refills: 0 | Status: SHIPPED | OUTPATIENT
Start: 2022-05-26 | End: 2022-05-31

## 2022-05-26 NOTE — PROGRESS NOTES
"Subjective:       Patient ID: Mikaela Oneil is a 56 y.o. female.    Vitals:  height is 5' 7" (1.702 m) and weight is 95.3 kg (210 lb). Her oral temperature is 99.6 °F (37.6 °C). Her blood pressure is 147/83 (abnormal) and her pulse is 85. Her respiration is 16 and oxygen saturation is 97%.     Chief Complaint: Cough    Patient started with cough/fever/congestion 1 day ago. Her spouse and grandchild are positive for influenza. Highest home temp was 101.0. She is covid vaccinated.     Provider note begins below:  Patient reports 1 day of fever, productive cough with yellow sputum production, chills, fatigue and headaches.  Patient reports that her  and grandchild tested positive for influenza recently.  Patient reports that she took Tylenol and Tessalon Perles at 2:30 a.m. this morning for her symptoms.  Patient denies chest pain, shortness of breath, vomiting, diarrhea or abdominal pain.    Cough  This is a new problem. The current episode started yesterday. The problem has been gradually worsening. The problem occurs every few minutes. The cough is productive of sputum. Associated symptoms include chills, a fever, headaches, nasal congestion, postnasal drip and rhinorrhea. Pertinent negatives include no chest pain, ear congestion, ear pain, rash, sore throat, shortness of breath or wheezing. The symptoms are aggravated by lying down. She has tried prescription cough suppressant for the symptoms. The treatment provided no relief. There is no history of asthma, bronchiectasis or bronchitis.       Constitution: Positive for chills, sweating, fatigue and fever.   HENT: Positive for postnasal drip. Negative for ear pain, ear discharge, facial swelling, congestion and sore throat.    Neck: Negative for painful lymph nodes.   Cardiovascular: Negative.  Negative for chest trauma, chest pain and sob on exertion.   Eyes: Negative.  Negative for eye itching and eye pain.   Respiratory: Positive for cough and sputum " production. Negative for chest tightness, shortness of breath, wheezing and asthma.    Gastrointestinal: Negative.  Negative for nausea, vomiting and diarrhea.   Endocrine: negative. cold intolerance and excessive thirst.   Genitourinary: Negative.  Negative for dysuria, frequency, urgency and hematuria.   Musculoskeletal: Negative for pain, trauma and joint pain.   Skin: Negative.  Negative for rash, wound and hives.   Allergic/Immunologic: Negative.  Negative for eczema, asthma, hives and itching.   Neurological: Positive for headaches. Negative for disorientation and altered mental status.   Hematologic/Lymphatic: Negative.  Negative for swollen lymph nodes.   Psychiatric/Behavioral: Negative.  Negative for altered mental status, disorientation and confusion.       Objective:      Physical Exam   Constitutional: She is oriented to person, place, and time. She appears well-developed. She is cooperative.  Non-toxic appearance. She does not appear ill. No distress.   HENT:   Head: Normocephalic and atraumatic.   Ears:   Right Ear: Hearing, tympanic membrane, external ear and ear canal normal. impacted cerumen  Left Ear: Hearing, tympanic membrane, external ear and ear canal normal. impacted cerumen  Nose: Nose normal. No mucosal edema, rhinorrhea, nasal deformity or congestion. No epistaxis. Right sinus exhibits no maxillary sinus tenderness and no frontal sinus tenderness. Left sinus exhibits no maxillary sinus tenderness and no frontal sinus tenderness.   Mouth/Throat: Uvula is midline, oropharynx is clear and moist and mucous membranes are normal. No trismus in the jaw. Normal dentition. No uvula swelling. No oropharyngeal exudate, posterior oropharyngeal edema or posterior oropharyngeal erythema.   Eyes: Conjunctivae and lids are normal. No scleral icterus.   Neck: Trachea normal and phonation normal. Neck supple. No edema present. No erythema present. No neck rigidity present.   Cardiovascular: Normal rate,  regular rhythm, normal heart sounds and normal pulses.   Pulmonary/Chest: Effort normal and breath sounds normal. No stridor. No respiratory distress. She has no decreased breath sounds. She has no wheezes. She has no rhonchi. She has no rales. She exhibits no tenderness.   Abdominal: Normal appearance. Soft. flat abdomen There is no abdominal tenderness. There is no left CVA tenderness and no right CVA tenderness.   Musculoskeletal: Normal range of motion.         General: No deformity. Normal range of motion.   Lymphadenopathy:     She has no cervical adenopathy.   Neurological: no focal deficit. She is alert, oriented to person, place, and time and at baseline. She exhibits normal muscle tone. Coordination normal.   Skin: Skin is warm, dry, intact, not diaphoretic and not pale.   Psychiatric: Her speech is normal and behavior is normal. Mood, judgment and thought content normal.   Nursing note and vitals reviewed.    The following results have been reviewed with the patient:  LABS-  Results for orders placed or performed in visit on 05/26/22   POCT Influenza A/B Molecular   Result Value Ref Range    POC Molecular Influenza A Ag Positive (A) Negative, Not Reported    POC Molecular Influenza B Ag Negative Negative, Not Reported     Acceptable Yes    POCT COVID-19 Rapid Screening   Result Value Ref Range    POC Rapid COVID Negative Negative     Acceptable Yes         IMAGING-  No results found.      Assessment:       1. Influenza A    2. Cough          Plan:       FOLLOWUP  Follow up if symptoms worsen or fail to improve, for PLEASE CONTACT PCP OR CONTACT THE EMERGENCY ROOM..     PATIENT INSTRUCTIONS  Patient Instructions   INSTRUCTIONS:  - Rest.  - Drink plenty of fluids.  - Take Tylenol and/or Ibuprofen as directed as needed for fever/pain.  Do not take more than the recommended dose.  - follow up with your PCP within the next 1-2 weeks as needed.  - You must understand that you have  received an Urgent Care treatment only and that you may be released before all of your medical problems are known or treated.   - You, the patient, will arrange for follow up care as instructed.   - If your condition worsens or fails to improve we recommend that you receive another evaluation at the ER immediately or contact your PCP to discuss your concerns.   - You can call [phone] or [phone] to help schedule an appointment with the appropriate provider.       OVER THE COUNTER RECOMMENDATIONS/SUGGESTIONS.     Make sure to stay well hydrated.     Use Nasal Saline to mechanically move any post nasal drip from your eustachian tube or from the back of your throat.     Use warm salt water gargles to ease your throat pain. Warm salt water gargles as needed for sore throat-  1/2 tsp salt to 1 cup warm water, gargle as desired.     Use an antihistamine such as Claritin, Zyrtec or Allegra to dry you out.      Use pseudoephedrine (behind the counter) to decongest. Pseudoephedrine  30 mg up to 240 mg /day. It can raise your blood pressure and give you palpitations.     Use mucinex (guaifenisin) to break up mucous up to 2400mg/day to loosen any mucous.   The mucinex DM pill has a cough suppressant that can be sedating. It can be used at night to stop the tickle at the back of your throat.  You can use Mucinex D (it has guaifenesin and a high dose of pseudoephedrine) in the mornings to help decongest.        Use Afrin in each nare for no longer than 3 days, as it is addictive. It can also dry out your mucous membranes and cause elevated blood pressure. This is especially useful if you are flying.     Use Flonase 1-2 sprays/nostril per day. It is a local acting steroid nasal spray, if you develop a bloody nose, stop using the medication immediately.     Sometimes Nyquil at night is beneficial to help you get some rest, however it is sedating and it does have an antihistamine, and tylenol.     Honey is a natural  cough suppressant that can be used.     Tylenol up to 4,000 mg a day is safe for short periods and can be used for body aches, pain, and fever. However in high doses and prolonged use it can cause liver irritation.     Ibuprofen is a non-steroidal anti-inflammatory that can be used for body aches, pain, and fever.However it can also cause stomach irritation if over used.           THANK YOU FOR ALLOWING ME TO PARTICIPATE IN YOUR HEALTHCARE,     Sean Bates NP   Influenza A  -     oseltamivir (TAMIFLU) 75 MG capsule; Take 1 capsule (75 mg total) by mouth 2 (two) times daily. for 5 days  Dispense: 10 capsule; Refill: 0    Cough  -     POCT Influenza A/B Molecular  -     POCT COVID-19 Rapid Screening

## 2022-05-26 NOTE — PATIENT INSTRUCTIONS
INSTRUCTIONS:  - Rest.  - Drink plenty of fluids.  - Take Tylenol and/or Ibuprofen as directed as needed for fever/pain.  Do not take more than the recommended dose.  - follow up with your PCP within the next 1-2 weeks as needed.  - You must understand that you have received an Urgent Care treatment only and that you may be released before all of your medical problems are known or treated.   - You, the patient, will arrange for follow up care as instructed.   - If your condition worsens or fails to improve we recommend that you receive another evaluation at the ER immediately or contact your PCP to discuss your concerns.   - You can call [phone] or [phone] to help schedule an appointment with the appropriate provider.       OVER THE COUNTER RECOMMENDATIONS/SUGGESTIONS.     Make sure to stay well hydrated.     Use Nasal Saline to mechanically move any post nasal drip from your eustachian tube or from the back of your throat.     Use warm salt water gargles to ease your throat pain. Warm salt water gargles as needed for sore throat-  1/2 tsp salt to 1 cup warm water, gargle as desired.     Use an antihistamine such as Claritin, Zyrtec or Allegra to dry you out.      Use pseudoephedrine (behind the counter) to decongest. Pseudoephedrine  30 mg up to 240 mg /day. It can raise your blood pressure and give you palpitations.     Use mucinex (guaifenisin) to break up mucous up to 2400mg/day to loosen any mucous.   The mucinex DM pill has a cough suppressant that can be sedating. It can be used at night to stop the tickle at the back of your throat.  You can use Mucinex D (it has guaifenesin and a high dose of pseudoephedrine) in the mornings to help decongest.        Use Afrin in each nare for no longer than 3 days, as it is addictive. It can also dry out your mucous membranes and cause elevated blood pressure. This is especially useful if you are flying.     Use Flonase 1-2 sprays/nostril per day. It is a  local acting steroid nasal spray, if you develop a bloody nose, stop using the medication immediately.     Sometimes Nyquil at night is beneficial to help you get some rest, however it is sedating and it does have an antihistamine, and tylenol.     Honey is a natural cough suppressant that can be used.     Tylenol up to 4,000 mg a day is safe for short periods and can be used for body aches, pain, and fever. However in high doses and prolonged use it can cause liver irritation.     Ibuprofen is a non-steroidal anti-inflammatory that can be used for body aches, pain, and fever.However it can also cause stomach irritation if over used.

## 2023-01-17 ENCOUNTER — TELEPHONE (OUTPATIENT)
Dept: OBSTETRICS AND GYNECOLOGY | Facility: CLINIC | Age: 58
End: 2023-01-17
Payer: COMMERCIAL

## 2023-01-17 NOTE — TELEPHONE ENCOUNTER
Pt advised she has been having pressure on her pelvic, and experiencing urinary frequency. Pt denies urinary urgency, odor, pain during urination, or lower back pain or pressure. Pt scheduled appt tomorrow at a different location. Pt also given the phone number for [facility] to see if there is availability at that loc on tomorrow.     ----- Message from Yamilet Recinos sent at 1/17/2023  8:14 AM CST -----  Type: Patient Call Back    Who called: Self     What is the request in detail: PT says she's not sure if she's going through menopause .. says she constantly going to the bathroom all hours of the night says its interfering with her sleep and she's asking to speak with someone     Can the clinic reply by [facility]? No     Would the patient rather a call back or a response via [facility]? Call     Best call back number: [phone] (cell)

## 2023-01-18 ENCOUNTER — OFFICE VISIT (OUTPATIENT)
Dept: OBSTETRICS AND GYNECOLOGY | Facility: CLINIC | Age: 58
End: 2023-01-18
Payer: COMMERCIAL

## 2023-01-18 VITALS
BODY MASS INDEX: 37.95 KG/M2 | DIASTOLIC BLOOD PRESSURE: 76 MMHG | HEIGHT: 67 IN | SYSTOLIC BLOOD PRESSURE: 128 MMHG | WEIGHT: 241.81 LBS

## 2023-01-18 DIAGNOSIS — N32.81 OVERACTIVE BLADDER: ICD-10-CM

## 2023-01-18 DIAGNOSIS — Z87.42 HISTORY OF OVARIAN CYST: Primary | ICD-10-CM

## 2023-01-18 DIAGNOSIS — R39.15 URINARY URGENCY: ICD-10-CM

## 2023-01-18 DIAGNOSIS — R31.9 HEMATURIA, UNSPECIFIED TYPE: ICD-10-CM

## 2023-01-18 PROCEDURE — 3074F PR MOST RECENT SYSTOLIC BLOOD PRESSURE < 130 MM HG: ICD-10-PCS | Mod: CPTII,S$GLB,,

## 2023-01-18 PROCEDURE — 99204 OFFICE O/P NEW MOD 45 MIN: CPT | Mod: S$GLB,,,

## 2023-01-18 PROCEDURE — 3074F SYST BP LT 130 MM HG: CPT | Mod: CPTII,S$GLB,,

## 2023-01-18 PROCEDURE — 3078F PR MOST RECENT DIASTOLIC BLOOD PRESSURE < 80 MM HG: ICD-10-PCS | Mod: CPTII,S$GLB,,

## 2023-01-18 PROCEDURE — 99204 PR OFFICE/OUTPT VISIT, NEW, LEVL IV, 45-59 MIN: ICD-10-PCS | Mod: S$GLB,,,

## 2023-01-18 PROCEDURE — 87077 CULTURE AEROBIC IDENTIFY: CPT

## 2023-01-18 PROCEDURE — 87186 SC STD MICRODIL/AGAR DIL: CPT

## 2023-01-18 PROCEDURE — 3008F BODY MASS INDEX DOCD: CPT | Mod: CPTII,S$GLB,,

## 2023-01-18 PROCEDURE — 87088 URINE BACTERIA CULTURE: CPT

## 2023-01-18 PROCEDURE — 3078F DIAST BP <80 MM HG: CPT | Mod: CPTII,S$GLB,,

## 2023-01-18 PROCEDURE — 1159F PR MEDICATION LIST DOCUMENTED IN MEDICAL RECORD: ICD-10-PCS | Mod: CPTII,S$GLB,,

## 2023-01-18 PROCEDURE — 1160F RVW MEDS BY RX/DR IN RCRD: CPT | Mod: CPTII,S$GLB,,

## 2023-01-18 PROCEDURE — 88112 PR  CYTOPATH, CELL ENHANCE TECH: ICD-10-PCS | Mod: 26,,, | Performed by: PATHOLOGY

## 2023-01-18 PROCEDURE — 1160F PR REVIEW ALL MEDS BY PRESCRIBER/CLIN PHARMACIST DOCUMENTED: ICD-10-PCS | Mod: CPTII,S$GLB,,

## 2023-01-18 PROCEDURE — 1159F MED LIST DOCD IN RCRD: CPT | Mod: CPTII,S$GLB,,

## 2023-01-18 PROCEDURE — 3008F PR BODY MASS INDEX (BMI) DOCUMENTED: ICD-10-PCS | Mod: CPTII,S$GLB,,

## 2023-01-18 PROCEDURE — 87086 URINE CULTURE/COLONY COUNT: CPT

## 2023-01-18 PROCEDURE — 88112 CYTOPATH CELL ENHANCE TECH: CPT | Performed by: PATHOLOGY

## 2023-01-18 PROCEDURE — 88112 CYTOPATH CELL ENHANCE TECH: CPT | Mod: 26,,, | Performed by: PATHOLOGY

## 2023-01-18 RX ORDER — OXYBUTYNIN CHLORIDE 5 MG/1
5 TABLET, EXTENDED RELEASE ORAL DAILY
Qty: 90 TABLET | Refills: 0 | Status: SHIPPED | OUTPATIENT
Start: 2023-01-18 | End: 2023-01-30 | Stop reason: ALTCHOICE

## 2023-01-18 NOTE — PROGRESS NOTES
"SUBJECTIVE:       Chief Complaint: Pelvic Pain (Pt is new to the clinic and from [place], [place]. Pt is a referral by NP Joselin Sanchez. Pt went to ER 5 months ago for pelvic pain and  urinating blood the patient was diagnosed an  ovarian cyst. )    History of Present Illness: Mikaela Oneil is a 57 y.o. female  presenting for urinary urgency and frequency. She states she frequently feels the urge to urinate and cannot void or dribbles urine. She was told by her previous provider that she had blood in her urine. She reports a history of a "large" ovarian cyst diagnosed approximately 5 months ago. She has had a hysterectomy and bladder sling previously.    Review of Systems   Constitutional:  Negative for activity change, appetite change, chills, fatigue, fever and unexpected weight change.   HENT:  Negative for nasal congestion, dental problem, ear pain, postnasal drip, rhinorrhea, sinus pressure/congestion, sneezing and sore throat.    Eyes:  Negative for discharge, visual disturbance and eye dryness.   Respiratory:  Negative for cough, chest tightness and shortness of breath.    Cardiovascular:  Negative for chest pain, palpitations and leg swelling.   Gastrointestinal:  Negative for abdominal distention, abdominal pain, change in bowel habit, constipation, diarrhea, nausea, vomiting, reflux and change in bowel habit.   Endocrine: Negative for cold intolerance, heat intolerance, polydipsia and polyuria.   Genitourinary:  Positive for difficulty urinating, frequency, pelvic pain and urgency. Negative for dyspareunia, dysuria, genital sores, hot flashes, vaginal bleeding, vaginal discharge, vaginal pain and vaginal dryness.   Musculoskeletal:  Negative for back pain, myalgias, neck pain and neck stiffness.   Integumentary:  Negative for rash, breast mass, breast discharge and breast tenderness.   Allergic/Immunologic: Negative for environmental allergies and immunocompromised state.   Neurological:  Negative for " "dizziness, syncope, weakness, light-headedness, numbness and headaches.   Hematological:  Negative for adenopathy. Does not bruise/bleed easily.   Psychiatric/Behavioral:  Negative for confusion, decreased concentration and sleep disturbance. The patient is not nervous/anxious.    Breast: Negative for mass and tenderness      OBJECTIVE:      /76   Ht 5' 7" (1.702 m)   Wt 109.7 kg (241 lb 12.8 oz)   BMI 37.87 kg/m²     Chaperone present.    Physical Exam:   Constitutional: She is oriented to person, place, and time. Vital signs are normal. She appears well-developed and well-nourished. She is cooperative.    HENT:   Head: Normocephalic and atraumatic.      Cardiovascular:  Normal rate and regular rhythm.             Pulmonary/Chest: Effort normal.          Genitourinary:    Inguinal canal, right adnexa and left adnexa normal.      Pelvic exam was performed with patient supine.   The external female genitalia was normal.   Genitalia hair distrobution normal .   Labial bartholins normal.There is cystocele in the vagina. Cervix is absent.Uterus is absent. Normal urethral meatus.Bladder findings: no bladder distention and no bladder tenderness          Musculoskeletal: Moves all extremeties.      Right lower leg: No edema.      Left lower leg: No edema.       Neurological: She is alert and oriented to person, place, and time. GCS eye subscore is 4. GCS verbal subscore is 5. GCS motor subscore is 6.    Skin: Skin is warm and dry. Capillary refill takes less than 2 seconds.    Psychiatric: She has a normal mood and affect. Her speech is normal and behavior is normal. Mood, affect and thought content normal.       ASSESSMENT:       1. History of ovarian cyst    2. Urinary urgency    3. Overactive bladder    4. Hematuria, unspecified type          PLAN:     History of ovarian cyst  -     US Pelvis Comp with Transvag NON-OB (xpd; Future; Expected date: 01/25/2023    Urinary urgency  -     POCT " Urinalysis(Instrument)  -     Urine culture    Overactive bladder  -     oxybutynin (DITROPAN-XL) 5 MG TR24; Take 1 tablet (5 mg total) by mouth once daily.  Dispense: 90 tablet; Refill: 0  -     Ambulatory referral/consult to Urology; Future; Expected date: 01/25/2023    Hematuria, unspecified type  -     Cytology, Urine    Referral to urology for persistent hematuria and overactive bladder. Trial of oxybutynin for S/S. TAMMY for previous records and ultrasound.      Follow up in about 4 weeks (around 2/15/2023) for evaluation of symptoms.

## 2023-01-20 LAB — BACTERIA UR CULT: ABNORMAL

## 2023-01-23 ENCOUNTER — PATIENT MESSAGE (OUTPATIENT)
Dept: OBSTETRICS AND GYNECOLOGY | Facility: CLINIC | Age: 58
End: 2023-01-23
Payer: COMMERCIAL

## 2023-01-23 DIAGNOSIS — B96.20 E. COLI UTI: Primary | ICD-10-CM

## 2023-01-23 DIAGNOSIS — N39.0 E. COLI UTI: Primary | ICD-10-CM

## 2023-01-23 LAB
FINAL PATHOLOGIC DIAGNOSIS: NORMAL
Lab: NORMAL

## 2023-01-25 RX ORDER — NITROFURANTOIN 25; 75 MG/1; MG/1
100 CAPSULE ORAL 2 TIMES DAILY
Qty: 14 CAPSULE | Refills: 0 | Status: SHIPPED | OUTPATIENT
Start: 2023-01-25 | End: 2023-02-01

## 2023-01-25 NOTE — TELEPHONE ENCOUNTER
Spoke to patient via telephone. Rx sent to pharmacy. All questions answered. Patient verbalized understanding.

## 2023-01-26 DIAGNOSIS — Z87.42 HISTORY OF OVARIAN CYST: Primary | ICD-10-CM

## 2023-01-27 ENCOUNTER — PATIENT MESSAGE (OUTPATIENT)
Dept: OBSTETRICS AND GYNECOLOGY | Facility: CLINIC | Age: 58
End: 2023-01-27
Payer: COMMERCIAL

## 2023-01-30 ENCOUNTER — PROCEDURE VISIT (OUTPATIENT)
Dept: MATERNAL FETAL MEDICINE | Facility: CLINIC | Age: 58
End: 2023-01-30
Payer: COMMERCIAL

## 2023-01-30 ENCOUNTER — OFFICE VISIT (OUTPATIENT)
Dept: UROLOGY | Facility: CLINIC | Age: 58
End: 2023-01-30
Payer: COMMERCIAL

## 2023-01-30 VITALS
HEART RATE: 60 BPM | WEIGHT: 239.88 LBS | SYSTOLIC BLOOD PRESSURE: 135 MMHG | BODY MASS INDEX: 37.57 KG/M2 | DIASTOLIC BLOOD PRESSURE: 85 MMHG

## 2023-01-30 DIAGNOSIS — N83.202 LEFT OVARIAN CYST: ICD-10-CM

## 2023-01-30 DIAGNOSIS — N83.201 RIGHT OVARIAN CYST: ICD-10-CM

## 2023-01-30 DIAGNOSIS — R10.2 PELVIC PAIN: Primary | ICD-10-CM

## 2023-01-30 DIAGNOSIS — N32.81 OVERACTIVE BLADDER: ICD-10-CM

## 2023-01-30 DIAGNOSIS — R31.0 GROSS HEMATURIA: Primary | ICD-10-CM

## 2023-01-30 DIAGNOSIS — Z87.42 HISTORY OF OVARIAN CYST: ICD-10-CM

## 2023-01-30 LAB
BACTERIA #/AREA URNS AUTO: ABNORMAL /HPF
MICROSCOPIC COMMENT: ABNORMAL
RBC #/AREA URNS AUTO: 0 /HPF (ref 0–4)
SQUAMOUS #/AREA URNS AUTO: 5 /HPF
WBC #/AREA URNS AUTO: 7 /HPF (ref 0–5)

## 2023-01-30 PROCEDURE — 99214 PR OFFICE/OUTPT VISIT, EST, LEVL IV, 30-39 MIN: ICD-10-PCS | Mod: 25,,, | Performed by: NURSE PRACTITIONER

## 2023-01-30 PROCEDURE — 1159F MED LIST DOCD IN RCRD: CPT | Mod: CPTII,,, | Performed by: NURSE PRACTITIONER

## 2023-01-30 PROCEDURE — 99999 PR PBB SHADOW E&M-EST. PATIENT-LVL III: CPT | Mod: PBBFAC,,, | Performed by: NURSE PRACTITIONER

## 2023-01-30 PROCEDURE — 1160F RVW MEDS BY RX/DR IN RCRD: CPT | Mod: CPTII,,, | Performed by: NURSE PRACTITIONER

## 2023-01-30 PROCEDURE — 88112 PR  CYTOPATH, CELL ENHANCE TECH: ICD-10-PCS | Mod: 26,,, | Performed by: PATHOLOGY

## 2023-01-30 PROCEDURE — 88112 CYTOPATH CELL ENHANCE TECH: CPT | Performed by: PATHOLOGY

## 2023-01-30 PROCEDURE — 3008F BODY MASS INDEX DOCD: CPT | Mod: CPTII,,, | Performed by: NURSE PRACTITIONER

## 2023-01-30 PROCEDURE — 3079F DIAST BP 80-89 MM HG: CPT | Mod: CPTII,,, | Performed by: NURSE PRACTITIONER

## 2023-01-30 PROCEDURE — 99214 OFFICE O/P EST MOD 30 MIN: CPT | Mod: 25,,, | Performed by: NURSE PRACTITIONER

## 2023-01-30 PROCEDURE — 81514 NFCT DS BV&VAGINITIS DNA ALG: CPT | Performed by: NURSE PRACTITIONER

## 2023-01-30 PROCEDURE — 3075F SYST BP GE 130 - 139MM HG: CPT | Mod: CPTII,,, | Performed by: NURSE PRACTITIONER

## 2023-01-30 PROCEDURE — 51701 PR INSERTION OF NON-INDWELLING BLADDER CATHETERIZATION FOR RESIDUAL UR: ICD-10-PCS | Mod: ,,, | Performed by: NURSE PRACTITIONER

## 2023-01-30 PROCEDURE — 88112 CYTOPATH CELL ENHANCE TECH: CPT | Mod: 26,,, | Performed by: PATHOLOGY

## 2023-01-30 PROCEDURE — 81001 URINALYSIS AUTO W/SCOPE: CPT | Performed by: NURSE PRACTITIONER

## 2023-01-30 PROCEDURE — 3075F PR MOST RECENT SYSTOLIC BLOOD PRESS GE 130-139MM HG: ICD-10-PCS | Mod: CPTII,,, | Performed by: NURSE PRACTITIONER

## 2023-01-30 PROCEDURE — 99999 PR PBB SHADOW E&M-EST. PATIENT-LVL III: ICD-10-PCS | Mod: PBBFAC,,, | Performed by: NURSE PRACTITIONER

## 2023-01-30 PROCEDURE — 3079F PR MOST RECENT DIASTOLIC BLOOD PRESSURE 80-89 MM HG: ICD-10-PCS | Mod: CPTII,,, | Performed by: NURSE PRACTITIONER

## 2023-01-30 PROCEDURE — 3008F PR BODY MASS INDEX (BMI) DOCUMENTED: ICD-10-PCS | Mod: CPTII,,, | Performed by: NURSE PRACTITIONER

## 2023-01-30 PROCEDURE — 76856 PR  ECHO,PELVIC (NONOBSTETRIC): ICD-10-PCS | Mod: S$GLB,,, | Performed by: OBSTETRICS & GYNECOLOGY

## 2023-01-30 PROCEDURE — 1160F PR REVIEW ALL MEDS BY PRESCRIBER/CLIN PHARMACIST DOCUMENTED: ICD-10-PCS | Mod: CPTII,,, | Performed by: NURSE PRACTITIONER

## 2023-01-30 PROCEDURE — 76856 US EXAM PELVIC COMPLETE: CPT | Mod: S$GLB,,, | Performed by: OBSTETRICS & GYNECOLOGY

## 2023-01-30 PROCEDURE — 51701 INSERT BLADDER CATHETER: CPT | Mod: ,,, | Performed by: NURSE PRACTITIONER

## 2023-01-30 PROCEDURE — 1159F PR MEDICATION LIST DOCUMENTED IN MEDICAL RECORD: ICD-10-PCS | Mod: CPTII,,, | Performed by: NURSE PRACTITIONER

## 2023-01-30 PROCEDURE — 87086 URINE CULTURE/COLONY COUNT: CPT | Performed by: NURSE PRACTITIONER

## 2023-01-30 RX ORDER — SOLIFENACIN SUCCINATE 5 MG/1
5 TABLET, FILM COATED ORAL DAILY
Qty: 30 TABLET | Refills: 11 | Status: SHIPPED | OUTPATIENT
Start: 2023-01-30 | End: 2023-02-17

## 2023-01-30 RX ORDER — LIDOCAINE HYDROCHLORIDE 20 MG/ML
JELLY TOPICAL ONCE
Status: CANCELLED | OUTPATIENT
Start: 2023-01-30 | End: 2023-01-30

## 2023-01-30 RX ORDER — CIPROFLOXACIN 500 MG/1
500 TABLET ORAL ONCE
Status: CANCELLED | OUTPATIENT
Start: 2023-01-30 | End: 2023-01-30

## 2023-01-30 NOTE — PROGRESS NOTES
The uterus is absent.  The right ovary is enlarged, measuring 6.3 x 3.7 x 2.9 cm, and contains 2 simple cysts measuring 2.2 and 3.0 cm each.  The left ovary also contains a simple cyst, which measures 2.8 cm in diameter.  Bilateral simple ovarian cysts

## 2023-01-30 NOTE — PROGRESS NOTES
CHIEF COMPLAINT:    Mrs. Oneil is a 57 y.o. female presenting for   Chief Complaint   Patient presents with    Urinary Frequency     All day long urinating      .  PRESENTING ILLNESS:    Mikaela Oneil is a 57 y.o. female who presents for urinary frequency.     New patient to urology department.  Presents today due to urinary frequency.  Recently evaluated by ob/gyn due to history of ovarian cyst.  Had abdominal pain and hematuria a couple months back.  Presented to the ED at [facility] and diagnosed with ovarian cyst.  Records are not available to review.  Since ED visit had follow up appointment with ob/gyn.  The ob/gyn has ordered an ultrasound to further investigate ovarian cyst.  Does not recall any work up for hematuria.  She also complains of urinary frequency with a slow stream.  OB/gyn prescribed oxybutynin which is not working for her.      Previous/Current Smoker: no  Radiation therapy to pelvis: no  Chemotherapy: no  Personal/ family history of bladder/ kidney cancer: no  Exposure to harmful chemicals: no  History of kidney stones: no      Urine cultures:  e coli <100K    REVIEW OF SYSTEMS:    Review of Systems   Constitutional:  Negative for chills and fever.   Respiratory:  Negative for shortness of breath.    Cardiovascular:  Negative for chest pain.   Gastrointestinal:  Positive for abdominal pain. Negative for constipation and diarrhea.   Genitourinary:  Positive for frequency and hematuria. Negative for dysuria, flank pain and urgency.   Neurological:  Negative for dizziness and weakness.      PATIENT HISTORY:    History reviewed. No pertinent past medical history.    Family History   Problem Relation Age of Onset    Cancer Paternal Grandmother     Cancer Father     Diabetes Mother     Breast cancer Paternal Aunt      labor Daughter        Allergies:  Azithromycin    Medications:    Current Outpatient Medications:     nitrofurantoin, macrocrystal-monohydrate, (MACROBID) 100 MG capsule, Take  1 capsule (100 mg total) by mouth 2 (two) times daily. for 7 days, Disp: 14 capsule, Rfl: 0    solifenacin (VESICARE) 5 MG tablet, Take 1 tablet (5 mg total) by mouth once daily., Disp: 30 tablet, Rfl: 11    PHYSICAL EXAMINATION:    Physical Exam  Vitals and nursing note reviewed.   Constitutional:       Appearance: Normal appearance. She is well-developed.   HENT:      Head: Normocephalic and atraumatic.   Eyes:      Pupils: Pupils are equal, round, and reactive to light.   Pulmonary:      Effort: Pulmonary effort is normal.   Genitourinary:     General: Normal vulva.      Exam position: Supine.      Urethra: No prolapse, urethral pain, urethral swelling or urethral lesion.      Vagina: Normal.      Rectum: Normal.   Musculoskeletal:         General: Normal range of motion.      Cervical back: Normal range of motion.   Skin:     General: Skin is warm and dry.   Neurological:      Mental Status: She is alert and oriented to person, place, and time.   Psychiatric:         Behavior: Behavior normal.     Consent verbally obtained.  Betadine prep was applied to the urethral meatus. An in and out cath was performed after voiding.  The PVR was 20 ml.       LABS:    U/a dipstick performed in office today: yellow, ph 5, 1.020, + protein, trace bld  Bladder scan performed by Nurse Sophia.  PVR 0 ml     IMPRESSION:    Encounter Diagnoses   Name Primary?    Gross hematuria Yes    Overactive bladder        PLAN:    Problem List Items Addressed This Visit    None  Visit Diagnoses       Gross hematuria    -  Primary    Relevant Orders    CT Urogram Abd Pelvis W WO    Cystoscopy    Cytology, Urine    Urine culture    Overactive bladder        Relevant Orders    Urinalysis Microscopic    POCT URINE DIPSTICK WITHOUT MICROSCOPE    POCT Bladder Scan    VAGINOSIS SCREEN BY DNA PROBE            1. Gross hematuria   I explained to the patient that the causes of hematuria, whether it be gross hematuria or microhematuria, are many, including  but not limited to  infections, kidney stones,  malignancy. Fortunately, for patients with microhematuria, the likelihood of finding an underlying  malignancy as the cause of the hematuria is very low at 1-2%. In patients with gross hematuria, the chances of an underlying  malignancy are higher but still low at 15-20%.    Nevertheless, I explained to the patient that the evaluation in both cases consists of upper tract imaging followed by flexible cystoscopy. I described the rationale and procedure for both and answered all questions.    Hematuria work up discussed in detail.  Will proceed with hematuria work up:  Cysto  CT urogram   Urine cytology  Urine culture     2. Overactive bladder    - stop oxybutynin xl   - start vesicare 5 mg daily    3. Hx uti   - send urine for culture and analysis   - continue macrobid as prescribed  4. Rtc for above appts    Trina Palma NP        I spent over 45 minutes with the patient. Over 50% of the visit was spent in counseling.

## 2023-01-31 ENCOUNTER — TELEPHONE (OUTPATIENT)
Dept: UROLOGY | Facility: CLINIC | Age: 58
End: 2023-01-31
Payer: COMMERCIAL

## 2023-01-31 ENCOUNTER — PATIENT MESSAGE (OUTPATIENT)
Dept: OBSTETRICS AND GYNECOLOGY | Facility: CLINIC | Age: 58
End: 2023-01-31
Payer: COMMERCIAL

## 2023-01-31 DIAGNOSIS — N83.8 ENLARGED OVARY: Primary | ICD-10-CM

## 2023-01-31 LAB
BACTERIA UR CULT: NO GROWTH
FINAL PATHOLOGIC DIAGNOSIS: NORMAL
Lab: NORMAL

## 2023-01-31 NOTE — TELEPHONE ENCOUNTER
I spoke with patient, who stated the gyn issue are watchful waiting for now, and now she wants to focus on her urology issues, patient agreed to ct,cysto appt dates,times location,providers.    I spoke with patient, who stated she cancelled her ct and her cysto because she is dealing with gyn issues. Patient stated she will call back to set everything up for urology when things straighten up with gyn issues. I advised her that its important that she keeps her appts with urology because we need to see why she has hematuria. I gave her my direct phone number and name to call when she is ready and I will keep a reminder note for her.

## 2023-02-01 LAB
BACTERIAL VAGINOSIS DNA: NEGATIVE
CANDIDA GLABRATA DNA: NEGATIVE
CANDIDA KRUSEI DNA: NEGATIVE
CANDIDA RRNA VAG QL PROBE: NEGATIVE
T VAGINALIS RRNA GENITAL QL PROBE: NEGATIVE

## 2023-02-03 ENCOUNTER — HOSPITAL ENCOUNTER (OUTPATIENT)
Dept: RADIOLOGY | Facility: HOSPITAL | Age: 58
Discharge: HOME | End: 2023-02-03
Attending: NURSE PRACTITIONER
Payer: COMMERCIAL

## 2023-02-03 DIAGNOSIS — R31.0 GROSS HEMATURIA: ICD-10-CM

## 2023-02-03 PROCEDURE — 25500020 PHARM REV CODE 255: Performed by: NURSE PRACTITIONER

## 2023-02-03 PROCEDURE — 74178 CT ABD&PLV WO CNTR FLWD CNTR: CPT | Mod: TC

## 2023-02-03 PROCEDURE — 74178 CT ABD&PLV WO CNTR FLWD CNTR: CPT | Mod: 26,,, | Performed by: RADIOLOGY

## 2023-02-03 PROCEDURE — 74178 CT UROGRAM ABD PELVIS W WO: ICD-10-PCS | Mod: 26,,, | Performed by: RADIOLOGY

## 2023-02-03 RX ADMIN — IOHEXOL 100 ML: 350 INJECTION, SOLUTION INTRAVENOUS at 07:02

## 2023-02-06 ENCOUNTER — TELEPHONE (OUTPATIENT)
Dept: UROLOGY | Facility: CLINIC | Age: 58
End: 2023-02-06
Payer: COMMERCIAL

## 2023-02-07 NOTE — TELEPHONE ENCOUNTER
Patient notified of CT scan results.  Ob/gyn has been following and ordered ultrasound of pelvis.  Has scheduled appointment 2/17/23.  Also notified of 12 mm soft tissue lesion on right kidney.  Will plan for more imaging in the future.  Patient has had multiple images and tests scheduled in the past month.  Plan for cystoscopy tomorrow.

## 2023-02-08 ENCOUNTER — PROCEDURE VISIT (OUTPATIENT)
Dept: UROLOGY | Facility: CLINIC | Age: 58
End: 2023-02-08
Payer: COMMERCIAL

## 2023-02-08 ENCOUNTER — TELEPHONE (OUTPATIENT)
Dept: UROLOGY | Facility: CLINIC | Age: 58
End: 2023-02-08
Payer: COMMERCIAL

## 2023-02-08 ENCOUNTER — LAB VISIT (OUTPATIENT)
Dept: LAB | Facility: HOSPITAL | Age: 58
End: 2023-02-08
Payer: COMMERCIAL

## 2023-02-08 VITALS
HEART RATE: 85 BPM | BODY MASS INDEX: 37.25 KG/M2 | TEMPERATURE: 98 F | SYSTOLIC BLOOD PRESSURE: 117 MMHG | WEIGHT: 237.31 LBS | DIASTOLIC BLOOD PRESSURE: 60 MMHG | RESPIRATION RATE: 18 BRPM | HEIGHT: 67 IN

## 2023-02-08 DIAGNOSIS — R31.0 GROSS HEMATURIA: ICD-10-CM

## 2023-02-08 DIAGNOSIS — N83.8 ENLARGED OVARY: ICD-10-CM

## 2023-02-08 DIAGNOSIS — N28.89 OTHER SPECIFIED DISORDERS OF KIDNEY AND URETER: Primary | ICD-10-CM

## 2023-02-08 LAB — CANCER AG125 SERPL-ACNC: 6 U/ML (ref 0–30)

## 2023-02-08 PROCEDURE — 52000 CYSTOSCOPY: ICD-10-PCS | Mod: S$GLB,,, | Performed by: UROLOGY

## 2023-02-08 PROCEDURE — 86304 IMMUNOASSAY TUMOR CA 125: CPT

## 2023-02-08 PROCEDURE — 36415 COLL VENOUS BLD VENIPUNCTURE: CPT

## 2023-02-08 PROCEDURE — 52000 CYSTOURETHROSCOPY: CPT | Mod: S$GLB,,, | Performed by: UROLOGY

## 2023-02-08 RX ORDER — LIDOCAINE HYDROCHLORIDE 20 MG/ML
JELLY TOPICAL ONCE
Status: COMPLETED | OUTPATIENT
Start: 2023-02-08 | End: 2023-02-08

## 2023-02-08 RX ORDER — CIPROFLOXACIN 500 MG/1
500 TABLET ORAL
Status: COMPLETED | OUTPATIENT
Start: 2023-02-08 | End: 2023-02-08

## 2023-02-08 RX ADMIN — LIDOCAINE HYDROCHLORIDE: 20 JELLY TOPICAL at 08:02

## 2023-02-08 RX ADMIN — CIPROFLOXACIN 500 MG: 500 TABLET ORAL at 08:02

## 2023-02-08 NOTE — PATIENT INSTRUCTIONS
What to Expect After a Cystoscopy  For the next 24-48 hours, you may feel a mild burning when you urinate. This burning is normal and expected. Drink plenty of water to dilute the urine to help relieve the burning sensation. You may also see a small amount of blood in your urine after the procedure.    Unless you are already taking antibiotics, you may be given an antibiotic after the test to prevent infection.    Signs and Symptoms to Report  Call the [facility] at [phone] if you develop any of the following:  Fever of 101 degrees or higher  Chills or persistent bleeding  Inability to urinate

## 2023-02-08 NOTE — PROCEDURES
Cystoscopy    Date/Time: 2/8/2023 8:45 AM  Performed by: Trina Ramirez MD  Authorized by: Trina Palma NP     Consent Done?:  Yes (Written)  Timeout: prior to procedure the correct patient, procedure, and site was verified    Prep: patient was prepped and draped in usual sterile fashion    Anesthesia:  Intraurethral instillation  Indications: hematuria    Position:  Dorsal lithotomy  Anesthesia:  Intraurethral instillation  Preparation: Patient was prepped and draped in usual sterile fashion    Scope type:  Flexible cystoscope  External exam normal: Yes    Urethra normal: Yes    Bladder neck normal: Yes    Bladder normal: Yes     patient tolerated the procedure well with no immediate complications  Comments:      No tumors noted. Hematuria likely due to UTI.   Has ovarian mass. Wants referral to gyn and wants it removed.   Cipro x 1

## 2023-02-17 ENCOUNTER — OFFICE VISIT (OUTPATIENT)
Dept: OBSTETRICS AND GYNECOLOGY | Facility: CLINIC | Age: 58
End: 2023-02-17
Payer: COMMERCIAL

## 2023-02-17 VITALS
DIASTOLIC BLOOD PRESSURE: 66 MMHG | WEIGHT: 243.38 LBS | HEART RATE: 101 BPM | BODY MASS INDEX: 38.2 KG/M2 | HEIGHT: 67 IN | SYSTOLIC BLOOD PRESSURE: 142 MMHG

## 2023-02-17 DIAGNOSIS — Z87.42 HISTORY OF OVARIAN CYST: ICD-10-CM

## 2023-02-17 DIAGNOSIS — N83.8 ENLARGED OVARY: Primary | ICD-10-CM

## 2023-02-17 PROCEDURE — 3008F BODY MASS INDEX DOCD: CPT | Mod: CPTII,S$GLB,,

## 2023-02-17 PROCEDURE — 1159F MED LIST DOCD IN RCRD: CPT | Mod: CPTII,S$GLB,,

## 2023-02-17 PROCEDURE — 1160F PR REVIEW ALL MEDS BY PRESCRIBER/CLIN PHARMACIST DOCUMENTED: ICD-10-PCS | Mod: CPTII,S$GLB,,

## 2023-02-17 PROCEDURE — 3078F PR MOST RECENT DIASTOLIC BLOOD PRESSURE < 80 MM HG: ICD-10-PCS | Mod: CPTII,S$GLB,,

## 2023-02-17 PROCEDURE — 3078F DIAST BP <80 MM HG: CPT | Mod: CPTII,S$GLB,,

## 2023-02-17 PROCEDURE — 99212 PR OFFICE/OUTPT VISIT, EST, LEVL II, 10-19 MIN: ICD-10-PCS | Mod: S$GLB,,,

## 2023-02-17 PROCEDURE — 3077F PR MOST RECENT SYSTOLIC BLOOD PRESSURE >= 140 MM HG: ICD-10-PCS | Mod: CPTII,S$GLB,,

## 2023-02-17 PROCEDURE — 3077F SYST BP >= 140 MM HG: CPT | Mod: CPTII,S$GLB,,

## 2023-02-17 PROCEDURE — 1159F PR MEDICATION LIST DOCUMENTED IN MEDICAL RECORD: ICD-10-PCS | Mod: CPTII,S$GLB,,

## 2023-02-17 PROCEDURE — 1160F RVW MEDS BY RX/DR IN RCRD: CPT | Mod: CPTII,S$GLB,,

## 2023-02-17 PROCEDURE — 99212 OFFICE O/P EST SF 10 MIN: CPT | Mod: S$GLB,,,

## 2023-02-17 PROCEDURE — 3008F PR BODY MASS INDEX (BMI) DOCUMENTED: ICD-10-PCS | Mod: CPTII,S$GLB,,

## 2023-02-17 NOTE — PROGRESS NOTES
SUBJECTIVE:       Chief Complaint: Follow Up Visit (Pt following up from 1month visit, wants to speak about seeing a surgeon )    History of Present Illness: Mikaela Oneil is a 57 y.o. female  presenting for follow-up. She reports she is no longer taking Vesicare as the urinary S/S resolved after completion of antibiotic for E. Coli UTI. Desires bilateral oophorectomy s/t enlarged right ovary and multiple bilateral ovarian cysts that cause pain.    Review of Systems   Constitutional:  Negative for activity change, appetite change, chills, fatigue, fever and unexpected weight change.   HENT:  Negative for nasal congestion, dental problem, ear pain, postnasal drip, rhinorrhea, sinus pressure/congestion, sneezing and sore throat.    Eyes:  Negative for discharge, visual disturbance and eye dryness.   Respiratory:  Negative for cough, chest tightness and shortness of breath.    Cardiovascular:  Negative for chest pain, palpitations and leg swelling.   Gastrointestinal:  Negative for abdominal distention, abdominal pain, change in bowel habit, constipation, diarrhea, nausea, vomiting, reflux and change in bowel habit.   Endocrine: Negative for cold intolerance, heat intolerance, polydipsia and polyuria.   Genitourinary:  Negative for difficulty urinating, dyspareunia, dysuria, frequency, genital sores, hot flashes, menstrual irregularity, menstrual problem, pelvic pain, urgency, vaginal bleeding, vaginal discharge, vaginal pain and vaginal dryness.   Musculoskeletal:  Negative for back pain, myalgias, neck pain and neck stiffness.   Integumentary:  Negative for rash, breast mass, breast discharge and breast tenderness.   Allergic/Immunologic: Negative for environmental allergies and immunocompromised state.   Neurological:  Negative for dizziness, syncope, weakness, light-headedness, numbness and headaches.   Hematological:  Negative for adenopathy. Does not bruise/bleed easily.   Psychiatric/Behavioral:  Negative  "for confusion, decreased concentration and sleep disturbance. The patient is not nervous/anxious.    Breast: Negative for mass and tenderness      OBJECTIVE:      BP (!) 142/66   Pulse 101   Ht 5' 7" (1.702 m)   Wt 110.4 kg (243 lb 6.4 oz)   BMI 38.12 kg/m²     Chaperone present.    Physical Exam:   Constitutional: She is oriented to person, place, and time. Vital signs are normal. She appears well-developed and well-nourished. She is cooperative.    HENT:   Head: Normocephalic and atraumatic.      Cardiovascular:  Normal rate and regular rhythm.             Pulmonary/Chest: Effort normal.                  Musculoskeletal: Moves all extremeties.      Right lower leg: No edema.      Left lower leg: No edema.       Neurological: She is alert and oriented to person, place, and time. GCS eye subscore is 4. GCS verbal subscore is 5. GCS motor subscore is 6.    Skin: Skin is warm and dry. Capillary refill takes less than 2 seconds.    Psychiatric: Her speech is normal and behavior is normal. Mood, affect and thought content normal.       ASSESSMENT:       1. Enlarged ovary    2. History of ovarian cyst          PLAN:     Enlarged ovary  -     Ambulatory referral/consult to Obstetrics / Gynecology; Future; Expected date: 02/24/2023    History of ovarian cyst    Patient desires surgical management of persistent ovarian cyst. Patient referred to local OBGYN for surgical consult and further evaluation and management.      Follow up as needed.       "

## 2023-08-13 ENCOUNTER — HOSPITAL ENCOUNTER (EMERGENCY)
Facility: HOSPITAL | Age: 58
Discharge: HOME | End: 2023-08-13
Attending: FAMILY MEDICINE
Payer: COMMERCIAL

## 2023-08-13 VITALS
OXYGEN SATURATION: 97 % | DIASTOLIC BLOOD PRESSURE: 84 MMHG | WEIGHT: 210 LBS | RESPIRATION RATE: 18 BRPM | BODY MASS INDEX: 32.96 KG/M2 | SYSTOLIC BLOOD PRESSURE: 117 MMHG | TEMPERATURE: 99 F | HEART RATE: 79 BPM | HEIGHT: 67 IN

## 2023-08-13 DIAGNOSIS — H60.501 ACUTE OTITIS EXTERNA OF RIGHT EAR, UNSPECIFIED TYPE: Primary | ICD-10-CM

## 2023-08-13 DIAGNOSIS — T16.1XXA FOREIGN BODY OF RIGHT EAR, INITIAL ENCOUNTER: ICD-10-CM

## 2023-08-13 PROCEDURE — 69200 CLEAR OUTER EAR CANAL: CPT | Mod: RT,ER

## 2023-08-13 PROCEDURE — 25000003 PHARM REV CODE 250: Mod: ER | Performed by: FAMILY MEDICINE

## 2023-08-13 PROCEDURE — 99283 EMERGENCY DEPT VISIT LOW MDM: CPT | Mod: ER

## 2023-08-13 RX ORDER — NEOMYCIN SULFATE, POLYMYXIN B SULFATE, HYDROCORTISONE 3.5; 10000; 1 MG/ML; [USP'U]/ML; MG/ML
4 SOLUTION/ DROPS AURICULAR (OTIC)
Status: COMPLETED | OUTPATIENT
Start: 2023-08-13 | End: 2023-08-13

## 2023-08-13 RX ORDER — NEOMYCIN SULFATE, POLYMYXIN B SULFATE AND HYDROCORTISONE 10; 3.5; 1 MG/ML; MG/ML; [USP'U]/ML
3 SUSPENSION/ DROPS AURICULAR (OTIC) 3 TIMES DAILY
Qty: 10 ML | Refills: 0 | Status: SHIPPED | OUTPATIENT
Start: 2023-08-13

## 2023-08-13 RX ORDER — IBUPROFEN 400 MG/1
800 TABLET ORAL
Status: DISCONTINUED | OUTPATIENT
Start: 2023-08-13 | End: 2023-08-13 | Stop reason: HOSPADM

## 2023-08-13 RX ADMIN — NEOMYCIN SULFATE, POLYMYXIN B SULFATE, HYDROCORTISONE 4 DROP: 3.5; 10000; 1 SOLUTION/ DROPS AURICULAR (OTIC) at 05:08

## 2024-02-12 ENCOUNTER — OFFICE VISIT (OUTPATIENT)
Dept: INTERNAL MEDICINE | Facility: CLINIC | Age: 59
End: 2024-02-12
Payer: COMMERCIAL

## 2024-02-12 ENCOUNTER — HOSPITAL ENCOUNTER (OUTPATIENT)
Dept: RADIOLOGY | Facility: HOSPITAL | Age: 59
Discharge: HOME | End: 2024-02-12
Payer: COMMERCIAL

## 2024-02-12 VITALS
HEIGHT: 67 IN | OXYGEN SATURATION: 99 % | DIASTOLIC BLOOD PRESSURE: 80 MMHG | WEIGHT: 244.5 LBS | BODY MASS INDEX: 38.37 KG/M2 | SYSTOLIC BLOOD PRESSURE: 118 MMHG | HEART RATE: 58 BPM

## 2024-02-12 DIAGNOSIS — M54.50 MIDLINE LOW BACK PAIN WITHOUT SCIATICA, UNSPECIFIED CHRONICITY: ICD-10-CM

## 2024-02-12 DIAGNOSIS — Z76.89 ENCOUNTER TO ESTABLISH CARE: ICD-10-CM

## 2024-02-12 DIAGNOSIS — M54.9 BACK PAIN, UNSPECIFIED BACK LOCATION, UNSPECIFIED BACK PAIN LATERALITY, UNSPECIFIED CHRONICITY: ICD-10-CM

## 2024-02-12 DIAGNOSIS — N39.46 MIXED STRESS AND URGE URINARY INCONTINENCE: ICD-10-CM

## 2024-02-12 DIAGNOSIS — Z13.9 SCREENING DUE: Primary | ICD-10-CM

## 2024-02-12 PROCEDURE — 3079F DIAST BP 80-89 MM HG: CPT | Mod: CPTII,S$GLB,,

## 2024-02-12 PROCEDURE — 72100 X-RAY EXAM L-S SPINE 2/3 VWS: CPT | Mod: TC

## 2024-02-12 PROCEDURE — 90715 TDAP VACCINE 7 YRS/> IM: CPT | Mod: S$GLB,,, | Performed by: STUDENT IN AN ORGANIZED HEALTH CARE EDUCATION/TRAINING PROGRAM

## 2024-02-12 PROCEDURE — 3074F SYST BP LT 130 MM HG: CPT | Mod: CPTII,S$GLB,,

## 2024-02-12 PROCEDURE — 99203 OFFICE O/P NEW LOW 30 MIN: CPT | Mod: 25,S$GLB,,

## 2024-02-12 PROCEDURE — 90686 IIV4 VACC NO PRSV 0.5 ML IM: CPT | Mod: S$GLB,,, | Performed by: STUDENT IN AN ORGANIZED HEALTH CARE EDUCATION/TRAINING PROGRAM

## 2024-02-12 PROCEDURE — G0008 ADMIN INFLUENZA VIRUS VAC: HCPCS | Mod: S$GLB,,, | Performed by: STUDENT IN AN ORGANIZED HEALTH CARE EDUCATION/TRAINING PROGRAM

## 2024-02-12 PROCEDURE — 1160F RVW MEDS BY RX/DR IN RCRD: CPT | Mod: CPTII,S$GLB,,

## 2024-02-12 PROCEDURE — 90472 IMMUNIZATION ADMIN EACH ADD: CPT | Mod: S$GLB,,, | Performed by: STUDENT IN AN ORGANIZED HEALTH CARE EDUCATION/TRAINING PROGRAM

## 2024-02-12 PROCEDURE — 3008F BODY MASS INDEX DOCD: CPT | Mod: CPTII,S$GLB,,

## 2024-02-12 PROCEDURE — 99999 PR PBB SHADOW E&M-EST. PATIENT-LVL V: CPT | Mod: PBBFAC,,,

## 2024-02-12 PROCEDURE — 72100 X-RAY EXAM L-S SPINE 2/3 VWS: CPT | Mod: 26,,, | Performed by: RADIOLOGY

## 2024-02-12 PROCEDURE — 1159F MED LIST DOCD IN RCRD: CPT | Mod: CPTII,S$GLB,,

## 2024-02-12 PROCEDURE — 3044F HG A1C LEVEL LT 7.0%: CPT | Mod: CPTII,S$GLB,,

## 2024-02-12 RX ORDER — TIZANIDINE 4 MG/1
4 TABLET ORAL EVERY 6 HOURS PRN
Qty: 30 TABLET | Refills: 0 | Status: SHIPPED | OUTPATIENT
Start: 2024-02-12 | End: 2024-02-22

## 2024-02-12 NOTE — PROGRESS NOTES
[facility]   Clinic Note    Patient Name: Mikaela Oneil   YOB: 1965     SUBJECTIVE:     Chief Complaint:    History of Present Illness:  Ms. Mikaela Oneil is a 58 y.o. female with history of gestational diabetes , urinary incontinence (uses pads and have used collagen  in the past) who presents to the clinic with lumbar back  pain that comes and goes. She defines it as a sharp stabbing pain that is postural that started    5 weeks  ago. She states it happens a couple of times a week, it hurts to stand up and she has to hunch over and it hurts the neck with this posture. She said that she doesn't like to take medications so she has not tried tylenol and ibuprofen but laying on her back makes it worse so sometimes she tries to sleep sitting up. She denies any numbness, tingling, weakness in her legs and straight leg raise was negative as well. She's a  at [employer]. She does endorse that she has a lot of responsibilities of taking care of her house,  who has health issues, and a job. Her depression screening was negative.       Review of Systems   Constitutional:  Negative for fever, malaise/fatigue and weight loss.   HENT: Negative.     Eyes: Negative.    Respiratory:  Negative for cough and shortness of breath.    Cardiovascular:  Negative for chest pain, orthopnea and leg swelling.   Gastrointestinal:  Negative for abdominal pain, constipation, diarrhea, nausea and vomiting.   Genitourinary:  Positive for frequency and urgency. Negative for hematuria.   Musculoskeletal:  Positive for back pain and neck pain.   Neurological:  Negative for tingling, tremors, sensory change, focal weakness and weakness.   Psychiatric/Behavioral:  Negative for depression. The patient is not nervous/anxious.        PAST HISTORY:     History reviewed. No pertinent past medical history.    Past Surgical History:   Procedure Laterality Date    gastric sleeve      HYSTERECTOMY  Outpatient Physical Therapy  [place]           [x] Phone: [phone]   Fax: [phone]  [place]           [] Phone: [phone]   Fax: [phone]      Physical Therapy Daily Treatment Note  Date:  2022     Patient Name:  Laura Guerra                   :  1970                   MRN: [id number]  [facility]  Referring Physician:  Kristin Max, *     PCP: Tashi Brothers MD  Next Doctor Visit:    Plan of care signed (Y/N):  yes  Outcome Measure: Oswestry 28%  Visit# / total visits: 5 /10  Pain level:      0/10       Goals:     Patient goals: decrease pain  Long Term Goals  Time Frame for Long Term Goals : 5 weeks  Long Term COVID      INCONTINENCE SURGERY      THYROID SURGERY         Family History   Problem Relation Age of Onset    Cancer Paternal Grandmother     Cancer Father     Diabetes Mother     Breast cancer Paternal Aunt      labor Daughter        Social History     Socioeconomic History    Marital status:    Tobacco Use    Smoking status: Never    Smokeless tobacco: Never   Substance and Sexual Activity    Alcohol use: No    Drug use: No    Sexual activity: Yes     Social Determinants of Health     Financial Resource Strain: Patient Declined (2024)    Overall Financial Resource Strain (CARDIA)     Difficulty of Paying Living Expenses: Patient declined   Food Insecurity: Patient Declined (2024)    Hunger Vital Sign     Worried About Running Out of Food in the Last Year: Patient declined     Ran Out of Food in the Last Year: Patient declined   Transportation Needs: No Transportation Needs (2024)    PRAPARE - Transportation     Lack of Transportation (Medical): No     Lack of Transportation (Non-Medical): No   Physical Activity: Inactive (2024)    Exercise Vital Sign     Days of Exercise per Week: 0 days     Minutes of Exercise per Session: 0 min   Stress: Stress Concern Present (2024)    [employer] - Occupational Stress Questionnaire     Feeling of Stress : To some extent   Social Connections: Unknown (2024)    Social Connection and Isolation Panel [NHANES]     Frequency of Communication with Friends and Family: More than three times a week     Frequency of Social Gatherings with Friends and Family: Once a week     Active Member of Clubs or Organizations: Yes     Attends Club or Organization Meetings: More than 4 times per year     Marital Status:    Housing Stability: Low Risk  (2024)    Housing Stability Vital Sign     Unable to Pay for Housing in the Last Year: No     Number of Places Lived in the Last Year: 1     Unstable Housing in the Last  screening questions were asked and patient attested that there had been no contact or symptoms      Exercises: (No more than 4 columns)   Exercise/Equipment Date 12/20/22 Date  12/28/22 #2 Date  12/30/22  #3 1/4/23 #4 [email or web address]  URL: [email or web address]  Date: 12/28/2022  Prepared by: Negrito Carlos    Exercises  Supine Dead Bug with Leg Extension - 1 x daily - 5-7 x weekly - 3 sets - 10 reps  Standing Hip Extension with Anchored Resistance - 1 x daily - 5-7 x weekly - 3 sets - 10 reps  Standing Hip Abduction with Anchored Resistance - 1 x daily - 5-7 x weekly - 3 sets - 10 reps  Standing Hip Adduction with "Year: No       MEDICATIONS & ALLERGIES:     Current Outpatient Medications on File Prior to Visit   Medication Sig    neomycin-polymyxin-hydrocortisone (CORTISPORIN) 3.5-10,000-1 mg/mL-unit/mL-% otic suspension Place 3 drops into the right ear 3 (three) times daily.     No current facility-administered medications on file prior to visit.       Review of patient's allergies indicates:   Allergen Reactions    Azithromycin Nausea Only       OBJECTIVE:     Vital Signs:  Vitals:    02/12/24 1449   BP: 118/80   Pulse: (!) 58   SpO2: 99%   Weight: 110.9 kg (244 lb 7.8 oz)   Height: 5' 7.01" (1.702 m)       Body mass index is 38.28 kg/m².     Physical Exam  Constitutional:       Appearance: She is obese.   Cardiovascular:      Rate and Rhythm: Normal rate and regular rhythm.      Pulses: Normal pulses.      Heart sounds: Normal heart sounds.   Pulmonary:      Effort: Pulmonary effort is normal.      Breath sounds: Normal breath sounds.   Abdominal:      General: Bowel sounds are normal.      Palpations: Abdomen is soft.   Musculoskeletal:      Right lower leg: No edema.      Left lower leg: No edema.      Comments: Straight leg raise negative, denies spinal or paraspinal tenderness.    Neurological:      General: No focal deficit present.      Mental Status: She is alert and oriented to person, place, and time.   Psychiatric:         Mood and Affect: Mood normal.         Behavior: Behavior normal.         Laboratory  Lab Results   Component Value Date    WBC 9.67 08/30/2006    HGB 13.9 08/30/2006    HCT 41.9 08/30/2006    MCV 90.1 08/30/2006     08/30/2006     BMP  Lab Results   Component Value Date     12/31/2021    K 3.6 12/31/2021     08/30/2006    CO2 30 12/31/2021    BUN 9.2 12/31/2021    CREATININE 0.69 12/31/2021    CALCIUM 8.9 12/31/2021    ANIONGAP 6 12/31/2021    EGFRNORACEVR 97 12/31/2021     Lab Results   Component Value Date    INR 1.2 08/30/2006     No results found for: "HGBA1C"      Health " Maintenance Due   Topic Date Due    Hepatitis C Screening  Never done    HIV Screening  Never done    Colorectal Cancer Screening  Never done    Shingles Vaccine (1 of 2) Never done    Mammogram  08/22/2018    Hemoglobin A1c (Diabetic Prevention Screening)  06/15/2021    Lipid Panel  06/15/2023    COVID-19 Vaccine (3 - 2023-24 season) 09/01/2023          HEALTH MAINTENANCE:   Immunizations:   Influenza: received today   Tdap:received today   Shingrix rec at 50: received today     Cancer Screening:  Colonoscopy: ordered       ASSESSMENT & PLAN:   Ms. Mikaela Oneil is a 58 y.o. female who presents to the clinic with low midline back pain that does not radiate, denies any weakness, numbness and straight leg raise was negative. There is no spinal or paraspinal tenderness patient does endorse some stress at work and home but depression screen was negative. She was given information about OTC pain management options like creams, patches, gel and heating pads. Xray lumbar spine is also ordered. Patient was instructed if xray comes back abnormal she might need MRI and/ or physical therapy. Prescribed Zanaflex as needed for spasm.        Screening due  -     HIV 1/2 Ag/Ab (4th Gen); Future; Expected date: 02/12/2024  -     HEPATITIS C ANTIBODY; Future; Expected date: 02/12/2024  -     HEMOGLOBIN A1C; Future; Expected date: 02/12/2024  -     Ambulatory referral/consult to Endo Procedure ; Future; Expected date: 02/13/2024  -     LIPID PANEL; Future; Expected date: 02/12/2024  -     Mammo Digital Screening Bilat; Future; Expected date: 02/12/2024    Encounter to establish care  -     LIPID PANEL; Future; Expected date: 02/12/2024  -     COMPREHENSIVE METABOLIC PANEL; Future; Expected date: 02/12/2024  -     CBC W/ AUTO DIFFERENTIAL; Future; Expected date: 02/12/2024  -     TSH; Future; Expected date: 02/12/2024    Back pain, unspecified back location, unspecified back pain laterality, unspecified chronicity  -      X-Ray Lumbar Spine 2 Or 3 Views; Future; Expected date: 02/12/2024    Mixed stress and urge urinary incontinence  -     Ambulatory referral/consult to Obstetrics / Gynecology; Future; Expected date: 02/19/2024    Midline low back pain without sciatica, unspecified chronicity    Other orders  -     Cancel: (In Office Administered) Zoster Recombinant Vaccine  -     (In Office Administered) Tdap Vaccine  -     Influenza - Quadrivalent *Preferred* (6 months+) (PF)  -     tiZANidine (ZANAFLEX) 4 MG tablet; Take 1 tablet (4 mg total) by mouth every 6 (six) hours as needed (As needed for back pain and stiff muscles).  Dispense: 30 tablet; Refill: 0        Return to clinic in 1 year or sooner as needed.    Patient was discussed with Dr. Bean.    Elzbieta Olvera MD  Internal Medicine, PGY-1  [facility]

## 2024-02-12 NOTE — PATIENT INSTRUCTIONS
Over the Counter Options for Pain    Voltaren Gel: Apply 2 g to skin over affected areas 4 times a day as needed.     Capsaicin Cream: Apply to skin over affected area 3 to 4 times a day as needed. Do not apply to open wounds or irritated skin. Wash your hands after application to avoid getting it in eyes.     Lidocaine (Salonpas) Patch: Apply over affected are and leave in place for 8 to 12 hours as needed.     Acetaminophen (Tylenol): Take 1,000 mg by mouth every 4 to 6 hours as needed. Do not take more than 4,000 mg in a day.     Ice Packs:  - Fill a bag with crushed ice about half full. Remove the air from the bag before you close it. You can also use a bag of frozen vegetables.    - Wrap the ice pack in a cloth to protect your skin from frostbite or other injury.    - Put the ice over the injured area for 20 to 30 minutes or as long as directed.  Check your skin after about 30 seconds for color changes or blistering. Remove the ice if you notice skin changes or you feel burning or numbness in the area.    - Throw the ice pack away after use.    - Apply ice to your injured area 4 times each day or as directed. .    Heating Pad:  Apply to affected area for NO LONGER than 15 minutes. Use a layer of towels between your skin and the heating pad. Remove for at least 1 hour then repeat. 2-3 applications a day is advisable.

## 2024-02-14 ENCOUNTER — HOSPITAL ENCOUNTER (OUTPATIENT)
Dept: RADIOLOGY | Facility: HOSPITAL | Age: 59
Discharge: HOME | End: 2024-02-14
Payer: COMMERCIAL

## 2024-02-14 VITALS — WEIGHT: 240 LBS | BODY MASS INDEX: 37.58 KG/M2

## 2024-02-14 DIAGNOSIS — Z13.9 SCREENING DUE: ICD-10-CM

## 2024-02-14 PROCEDURE — 77063 BREAST TOMOSYNTHESIS BI: CPT | Mod: 26,,, | Performed by: RADIOLOGY

## 2024-02-14 PROCEDURE — 77067 SCR MAMMO BI INCL CAD: CPT | Mod: TC

## 2024-02-14 PROCEDURE — 77067 SCR MAMMO BI INCL CAD: CPT | Mod: 26,,, | Performed by: RADIOLOGY

## 2024-02-19 ENCOUNTER — TELEPHONE (OUTPATIENT)
Dept: INTERNAL MEDICINE | Facility: CLINIC | Age: 59
End: 2024-02-19
Payer: COMMERCIAL

## 2024-02-19 NOTE — TELEPHONE ENCOUNTER
Tried calling patient to inform her about her X ray results and offer physical therapy, but patient didn't respond.

## 2024-02-29 NOTE — PROGRESS NOTES
I have reviewed and concur with the resident's history, assessment, and plan.       Jose Bean MD  Family Medicine  [facility]  02/29/2024

## 2024-03-30 ENCOUNTER — HOSPITAL ENCOUNTER (EMERGENCY)
Facility: HOSPITAL | Age: 59
Discharge: HOME | End: 2024-03-30
Attending: EMERGENCY MEDICINE
Payer: COMMERCIAL

## 2024-03-30 VITALS
OXYGEN SATURATION: 99 % | HEIGHT: 67 IN | WEIGHT: 200 LBS | TEMPERATURE: 99 F | SYSTOLIC BLOOD PRESSURE: 148 MMHG | RESPIRATION RATE: 18 BRPM | DIASTOLIC BLOOD PRESSURE: 62 MMHG | BODY MASS INDEX: 31.39 KG/M2 | HEART RATE: 64 BPM
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
## 2024-03-30 DIAGNOSIS — S52.132A CLOSED DISPLACED FRACTURE OF NECK OF LEFT RADIUS, INITIAL ENCOUNTER: Primary | ICD-10-CM

## 2024-03-30 PROCEDURE — 99283 EMERGENCY DEPT VISIT LOW MDM: CPT | Mod: 25

## 2024-03-30 PROCEDURE — 29105 APPLICATION LONG ARM SPLINT: CPT | Mod: LT

## 2024-03-30 PROCEDURE — 25000003 PHARM REV CODE 250

## 2024-03-30 RX ORDER — OXYCODONE AND ACETAMINOPHEN 5; 325 MG/1; MG/1
1 TABLET ORAL
Status: COMPLETED | OUTPATIENT
Start: 2024-03-30 | End: 2024-03-30

## 2024-03-30 RX ORDER — OXYCODONE AND ACETAMINOPHEN 5; 325 MG/1; MG/1
1 TABLET ORAL EVERY 6 HOURS PRN
Qty: 12 TABLET | Refills: 0 | Status: SHIPPED | OUTPATIENT
Start: 2024-03-30 | End: 2024-04-25

## 2024-03-30 RX ADMIN — OXYCODONE HYDROCHLORIDE AND ACETAMINOPHEN 1 TABLET: 5; 325 TABLET ORAL at 11:03

## 2024-03-30 NOTE — ED PROVIDER NOTES
Encounter Date: 3/30/2024    SCRIBE #1 NOTE: ILou, am scribing for, and in the presence of,  Agustin Bush PA-C. I have scribed the following portions of the note - Other sections scribed: HPI, ROS, PE.       History     Chief Complaint   Patient presents with    Arm Injury     Patient reports trip and fall, has pain to left wrist, elbow and arm, abrasions to bilateral hands. Denies being on blood thinners, or hitting head       Patient is a 58 y.o. female with no past medical history who presents to the Emergency Department for evaluation of Left Hand, Wrist, and Elbow Pain s/p mechanical trip and fall 45 minutes PTA.  She denies prodrome of symptoms causing her to fall.  Patient states she was getting ready for a picnic when her foot was caught on something and she broke her fall with her arm. Patient also states she is still able to ambulate. She has not taken any medications for the symptoms.  She denies being on blood thinners. She denies head trauma.        The history is provided by the patient. No  was used.     Review of patient's allergies indicates:   Allergen Reactions    Azithromycin Nausea Only     History reviewed. No pertinent past medical history.  Past Surgical History:   Procedure Laterality Date    gastric sleeve      HYSTERECTOMY      INCONTINENCE SURGERY      THYROID SURGERY       Family History   Problem Relation Age of Onset    Cancer Paternal Grandmother     Cancer Father     Diabetes Mother     Breast cancer Paternal Aunt      labor Daughter      Social History     Tobacco Use    Smoking status: Never    Smokeless tobacco: Never   Substance Use Topics    Alcohol use: No    Drug use: No     Review of Systems   Constitutional:  Negative for chills and fever.   Respiratory:  Negative for shortness of breath.    Cardiovascular:  Negative for chest pain.   Gastrointestinal:  Negative for abdominal pain, nausea and vomiting.   Musculoskeletal:  Positive for  arthralgias (left hand, wrist, elbow). Negative for joint swelling, neck pain and neck stiffness.   Skin:  Negative for color change.   Neurological:  Negative for dizziness, syncope, light-headedness, numbness and headaches.        (-) head trauma         Physical Exam     Initial Vitals [03/30/24 1034]   BP Pulse Resp Temp SpO2   (!) 151/69 68 18 99.1 °F (37.3 °C) 99 %      MAP       --         Physical Exam    Nursing note and vitals reviewed.  Constitutional: She appears well-developed and well-nourished.   HENT:   Head: Normocephalic and atraumatic.   Right Ear: External ear normal.   Left Ear: External ear normal.   Neck: Carotid bruit is not present.   Normal range of motion.  Cardiovascular:  Normal rate, regular rhythm, normal heart sounds and intact distal pulses.     Exam reveals no gallop and no friction rub.       No murmur heard.  Pulmonary/Chest: Breath sounds normal. No respiratory distress. She has no wheezes. She has no rhonchi. She has no rales.   Abdominal: Abdomen is soft. Bowel sounds are normal. She exhibits no distension. There is no abdominal tenderness. There is no rebound and no guarding.   Musculoskeletal:         General: Normal range of motion.      Cervical back: Normal range of motion.      Comments: Limited ROM of left upper extremity secondary to pain.  No swelling, deformity, or color change.  Neurovascularly intact.      Neurological: She is alert and oriented to person, place, and time. GCS score is 15. GCS eye subscore is 4. GCS verbal subscore is 5. GCS motor subscore is 6.   Psychiatric: She has a normal mood and affect.         ED Course   Procedures  Labs Reviewed - No data to display       Imaging Results              X-Ray Elbow Complete Left (Final result)  Result time 03/30/24 12:48:59      Final result by Med Oakley MD (03/30/24 12:48:59)                   Impression:      1. Impacted radial neck fracture as described.      Electronically signed by: Med  MD Adin  Date:    03/30/2024  Time:    12:48               Narrative:    EXAMINATION:  XR ELBOW COMPLETE 3 VIEW LEFT    CLINICAL HISTORY:  Unspecified fall, initial encounter    TECHNIQUE:  AP, lateral, and oblique views of the left elbow were performed.    COMPARISON:  None    FINDINGS:  Three views left elbow.    There is mild displacement of the anterior and posterior elbow fat pads.  The anterior humeral line and radiocapitellar line are in appropriate orientation.  There is osteopenia.  There is angulation of the radial head noting impacted fracture at the neck.  No radiopaque foreign body.                                       X-Ray Wrist Complete Left (Final result)  Result time 03/30/24 12:21:15      Final result by Med Oakley MD (03/30/24 12:21:15)                   Impression:      1. Mild edema about the hand, no acute displaced fracture or dislocation of the hand or wrist.      Electronically signed by: Med Oakley MD  Date:    03/30/2024  Time:    12:21               Narrative:    EXAMINATION:  XR WRIST COMPLETE 3 VIEWS LEFT; XR HAND COMPLETE 3 VIEW LEFT    CLINICAL HISTORY:  fall; Unspecified fall, initial encounter    TECHNIQUE:  PA, lateral, and oblique views of the left wrist were performed.  Three views left hand.    COMPARISON:  None    FINDINGS:  Three views left wrist, three views left hand.    There is some degree of osteopenia.  No acute displaced fracture or dislocation of the wrist.  There are degenerative changes of the PIP and D IP joints.  No acute displaced fracture or dislocation of the hand.  No radiopaque foreign body.  There is mild edema about the hand.                                       X-Ray Hand 3 view Left (Final result)  Result time 03/30/24 12:21:15      Final result by Med Oakley MD (03/30/24 12:21:15)                   Impression:      1. Mild edema about the hand, no acute displaced fracture or dislocation of the hand or  wrist.      Electronically signed by: Med Oakley MD  Date:    03/30/2024  Time:    12:21               Narrative:    EXAMINATION:  XR WRIST COMPLETE 3 VIEWS LEFT; XR HAND COMPLETE 3 VIEW LEFT    CLINICAL HISTORY:  fall; Unspecified fall, initial encounter    TECHNIQUE:  PA, lateral, and oblique views of the left wrist were performed.  Three views left hand.    COMPARISON:  None    FINDINGS:  Three views left wrist, three views left hand.    There is some degree of osteopenia.  No acute displaced fracture or dislocation of the wrist.  There are degenerative changes of the PIP and D IP joints.  No acute displaced fracture or dislocation of the hand.  No radiopaque foreign body.  There is mild edema about the hand.                                       X-Ray Shoulder 2 or More Views Left (Final result)  Result time 03/30/24 11:38:43   Procedure changed from X-Ray Shoulder Trauma Left     Final result by Med Oakley MD (03/30/24 11:38:43)                   Impression:      1. No acute displaced fracture or dislocation of the left shoulder noting degenerative changes.      Electronically signed by: Med Oakley MD  Date:    03/30/2024  Time:    11:38               Narrative:    EXAMINATION:  XR SHOULDER COMPLETE 2 OR MORE VIEWS LEFT    CLINICAL HISTORY:  fall;Unspecified fall, initial encounter    TECHNIQUE:  Two or three views of the left shoulder were performed.    COMPARISON:  None    FINDINGS:  Two views left shoulder.    The left humeral head maintains appropriate relationship with the glenoid.  There is a well corticated ossific/calcific focus along the superolateral aspect of the left humeral head, suggesting sequela of calcific tendinosis.  The acromioclavicular joint is intact.  No acute displaced left rib fracture.                                       Medications   oxyCODONE-acetaminophen 5-325 mg per tablet 1 tablet (1 tablet Oral Given 3/30/24 1134)     Medical Decision Making  This is an  emergent evaluation of a 58-year-old female who presents to the emergency department for evaluation of left upper extremity pain after mechanical trip and fall that occurred approximately 45 minutes prior to arrival.    Clinically, patient looks well.  There is limited range of motion of the left upper extremity secondary to pain.  There is no obvious deformity, swelling, overlying color change.  Neurovascularly intact. Regular rate rhythm without murmurs.  No carotid bruits appreciated on exam. Lungs are clear to auscultation bilaterally.  Abdomen is soft, nontender, non distended, with normal bowel sounds.     Differential diagnosis includes but is not limited to fracture, dislocation, sprain.  Considered septic joint but highly doubtful given no joint erythema, swelling, mild range of motion, and no systemic symptoms.     Workup initiated with x-rays of left hand, left wrist, left elbow, left shoulder.  Ordered 1 Percocet for acute pain.  X-rays remarkable for impacted radial neck fracture.  Ordered patient to be placed in long-arm splint by nursing staff.  Will discharge home with urgent follow-up with orthopedics.  Will discharge home with a short course of pain medication. Patient is very well appearing, and in no acute distress. Vital signs are reassuring here in the emergency department, patient is afebrile, breathing comfortable, satting 99 % on room air. Patient/Caregiver is stable for discharge at this time. Patient/Caregiver verbalize understanding of care plan. All questions and concerns were addressed. Discussed strict return precautions with the patient/caregiver. Instructed follow up with primary care provider, ortho within 1 week.      Agustin Bush PA-C    DISCLAIMER: This note was prepared with [employer] voice recognition transcription software. Garbled syntax, mangled pronouns, and other bizarre constructions may be attributed to that software system.     Amount and/or Complexity of Data  Reviewed  Radiology:  Decision-making details documented in ED Course.    Risk  Prescription drug management.            Scribe Attestation:   Scribe #1: I performed the above scribed service and the documentation accurately describes the services I performed. I attest to the accuracy of the note.        ED Course as of 03/30/24 1335   Sat Mar 30, 2024   1147 X-Ray Shoulder 2 or More Views Left  1. No acute displaced fracture or dislocation of the left shoulder noting degenerative changes. [TM]   1232 X-Ray Hand 3 view Left  1. Mild edema about the hand, no acute displaced fracture or dislocation of the hand or wrist. [TM]      ED Course User Index  [TM] Agustin Bush PA-C       I, Agustin Bush PA-C, personally performed the services described in this documentation. All medical record entries made by the scribe were at my direction and in my presence.  I have reviewed the chart and agree that the record reflects my personal performance and is accurate and complete.                      Clinical Impression:  Final diagnoses:  [W19.XXXA] Fall  [S52.132A] Closed displaced fracture of neck of left radius, initial encounter (Primary)          ED Disposition Condition    Discharge Stable          ED Prescriptions       Medication Sig Dispense Start Date End Date Auth. Provider    oxyCODONE-acetaminophen (PERCOCET) 5-325 mg per tablet Take 1 tablet by mouth every 6 (six) hours as needed for Pain. 12 tablet 3/30/2024 -- Agustin Bush PA-C          Follow-up Information       Follow up With Specialties Details Why Contact Info    Jefferson Saldaña MD Family Medicine   [address] [place] [address]  [id number]  [place] [address] [phone]      [facility] Emergency Dept Emergency Medicine Go to  As needed, If symptoms worsen, or new symptoms develop [address] [facility] [place] [address] [phone]    Nriali Cruz MD Orthopedic Surgery   [address]  [facility] [address]  [phone]               Agustin Bush PA-C  03/30/24 5412

## 2024-03-30 NOTE — ED NOTES
Pt reports she tripped and fell today at a park on uneven ground. Pt. Reports she braced during the fall. Pt. Is noted with an abrasion to her right hand however reports pain to her left wrist, elbow and arm. Pt. Reports she also has bilat knee pain.

## 2024-03-30 NOTE — DISCHARGE INSTRUCTIONS
Encounter Date: 1/8/2023       History     Chief Complaint   Patient presents with    Shortness of Breath     Sob with hx of copd and chest pain; cough noted;     Chest Pain     61-year-old gentleman with a history of hypertension, diabetes mellitus type 2, COPD, multiple sclerosis presents emergency room complaints of cough, fever, shortness of breath that began yesterday afternoon.  Reports symptoms are moderate, nothing makes better or worse.  Did not take any medications prior to coming to the emergency room.    The history is provided by the patient.   Review of patient's allergies indicates:   Allergen Reactions    Lisinopril Anaphylaxis    Penicillins Swelling     Past Medical History:   Diagnosis Date    Diabetes mellitus     Hypertension     MS (multiple sclerosis)      Past Surgical History:   Procedure Laterality Date    BACK SURGERY      BRAIN TUMOR EXCISION      KNEE SURGERY      SPINE SURGERY       No family history on file.  Social History     Tobacco Use    Smoking status: Some Days     Types: Cigars   Substance Use Topics    Alcohol use: Yes    Drug use: Never     Review of Systems   Constitutional:  Positive for chills, fatigue and fever.   HENT:  Negative for ear pain, rhinorrhea and sore throat.    Eyes:  Negative for photophobia and pain.   Respiratory:  Positive for cough, shortness of breath and wheezing.    Cardiovascular:  Positive for chest pain.   Gastrointestinal:  Negative for abdominal pain, diarrhea, nausea and vomiting.   Genitourinary:  Negative for dysuria.   Neurological:  Negative for dizziness, weakness and headaches.   All other systems reviewed and are negative.    Physical Exam     Initial Vitals   BP Pulse Resp Temp SpO2   01/08/23 2133 01/08/23 2012 01/08/23 2012 01/08/23 2012 01/08/23 2012   (!) 161/87 105 (!) 26 (!) 101.5 °F (38.6 °C) 98 %      MAP       --                Physical Exam    Nursing note and vitals reviewed.  Constitutional: He appears well-developed and  You were seen here in the emergency department today after a fall and diagnosed with an elbow fracture.  Please remain in splint that was applied and follow-up with orthopedics.  I have placed a referral, someone should reach out to you.  You may take Percocets as prescribed for pain.  Return to the emergency room for any new or worsening symptoms.  Thank you for allowing me to care for you today.  Feel better soon. Happy Easter!   well-nourished.   HENT:   Head: Normocephalic and atraumatic.   Eyes: EOM are normal. Pupils are equal, round, and reactive to light.   Neck: Neck supple.   Normal range of motion.  Cardiovascular:  Normal rate, regular rhythm, normal heart sounds and intact distal pulses.     Exam reveals no gallop and no friction rub.       No murmur heard.  Pulmonary/Chest: No respiratory distress. He has wheezes. He has rhonchi.   Patient currently with expiratory wheezing and rhonchi, with intermittent significant coughing fits.   Abdominal: Abdomen is soft. Bowel sounds are normal. He exhibits no distension. There is no abdominal tenderness.   Musculoskeletal:         General: Normal range of motion.      Cervical back: Normal range of motion and neck supple.     Neurological: He is alert and oriented to person, place, and time. He has normal strength.   Skin: Skin is warm and dry. Capillary refill takes less than 2 seconds.   Psychiatric: He has a normal mood and affect. His behavior is normal. Judgment and thought content normal.       ED Course   Procedures  Labs Reviewed   COMPREHENSIVE METABOLIC PANEL - Abnormal; Notable for the following components:       Result Value    Carbon Dioxide 19 (*)     Glucose Level 158 (*)     All other components within normal limits   CK - Abnormal; Notable for the following components:    Creatine Kinase 210 (*)     All other components within normal limits   COVID/FLU A&B PCR - Abnormal; Notable for the following components:    Influenza A PCR Detected (*)     All other components within normal limits    Narrative:     The Xpert Xpress SARS-CoV-2/FLU/RSV plus is a rapid, multiplexed real-time PCR test intended for the simultaneous qualitative detection and differentiation of SARS-CoV-2, Influenza A, Influenza B, and respiratory syncytial virus (RSV) viral RNA in either nasopharyngeal swab or nasal swab specimens.         CBC WITH DIFFERENTIAL - Abnormal; Notable for the following  components:    RBC 3.73 (*)     Hgb 12.2 (*)     Hct 36.4 (*)     MCV 97.6 (*)     All other components within normal limits   CK-MB - Normal   TROPONIN I - Normal   B-TYPE NATRIURETIC PEPTIDE - Normal   CBC W/ AUTO DIFFERENTIAL    Narrative:     The following orders were created for panel order CBC Auto Differential.  Procedure                               Abnormality         Status                     ---------                               -----------         ------                     CBC with Differential[[id number]]        Abnormal            Final result                 Please view results for these tests on the individual orders.     EKG Readings: (Independently Interpreted)   01/08/2023 8:13 PM  Rate: 98 bpm  Rhythm: Sinus  Axis: Normal  Intervals: Normal  ST Changes: None  Impression: Normal sinus rhythm, normal EKG.         Imaging Results              X-Ray Chest 1 View (Preliminary result)  Result time 01/08/23 21:05:35      ED Interpretation by Arvind Dinh MD (01/08/23 21:05:35, [facility] - Emergency Dept, Emergency Medicine)    No acute abnormality appreciated.                                     Medications   sodium chloride 0.9% bolus 1,000 mL 1,000 mL (1,000 mLs Intravenous New Bag 1/8/23 2147)   acetaminophen tablet 1,000 mg (1,000 mg Oral Given 1/8/23 2038)   albuterol-ipratropium 2.5 mg-0.5 mg/3 mL nebulizer solution 6 mL (6 mLs Nebulization Given 1/8/23 2037)   ondansetron injection 4 mg (4 mg Intravenous Given 1/8/23 2038)   guaiFENesin 100 mg/5 ml syrup 200 mg (200 mg Oral Given 1/8/23 2038)   LIDOcaine (PF) 40 mg/mL (4 %) injection 10 mL (10 mLs Nebulization Given 1/8/23 2100)   ibuprofen tablet 600 mg (600 mg Oral Given 1/8/23 2210)     Medical Decision Making:   Initial Assessment:   Patient currently febrile with a 101.5 fever.  Patient appears nontoxic, but having intermittent coughing fits.  Patient does have expiratory wheezing.  Will treat as COPD,  obtain a chest x-ray along with laboratory evaluation and also evaluate for COVID/flu.  Will continue to monitor.           ED Course as of 01/08/23 2214   Sun Jan 08, 2023 2101 WBC: 5.3 [MW]   2101 Hemoglobin(!): 12.2 [MW]   2101 Hematocrit(!): 36.4 [MW]   2101 Platelets: 165 [MW]   2101 Neut %: 73.1 [MW]   2101 Sodium: 137 [MW]   2101 Potassium: 4.0 [MW]   2101 Chloride: 106 [MW]   2101 CO2(!): 19 [MW]   2101 Glucose(!): 158 [MW]   2101 BUN: 13.3 [MW]   2101 Creatinine: 1.03 [MW]   2101 CPK(!): 210 [MW]   2105 CPK MB: 1.6 [MW]   2105 BNP: 74.9 [MW]   2105 Troponin I: 0.014 [MW]   2115 Influenza A, Molecular(!): Detected [MW]   2117 Patient feeling much improved since receiving lidocaine neb with decreasing shortness of breath-currently receiving nebulizer treatment.  Patient noted to be positive for influenza A.  Vital signs stable otherwise.  Patient has a mild tachycardia, therefore will give 1 L of IV fluids while here in the emergency room.   [MW]      ED Course User Index  [MW] Arvind Dinh MD                 Clinical Impression:   Final diagnoses:  [R06.00] Dyspnea  [J10.1] Influenza A (Primary)        ED Disposition Condition    Discharge Stable          ED Prescriptions       Medication Sig Dispense Start Date End Date Auth. Provider    cetirizine (ZYRTEC) 10 MG tablet Take 1 tablet (10 mg total) by mouth once daily. for 14 days 14 tablet 1/8/2023 1/22/2023 Arvind Dinh MD    benzonatate (TESSALON) 100 MG capsule Take 1 capsule (100 mg total) by mouth 3 (three) times daily as needed for Cough. 20 capsule 1/8/2023 1/29/2023 Arvind Dinh MD    oseltamivir (TAMIFLU) 75 MG capsule Take 1 capsule (75 mg total) by mouth 2 (two) times daily. for 5 days 10 capsule 1/8/2023 1/13/2023 Arvind Dinh MD    guaiFENesin-codeine 100-10 mg/5 ml (TUSSI-ORGANIDIN NR)  mg/5 mL syrup Take 5 mLs by mouth every 4 (four) hours as needed for Cough. 237 mL 1/8/2023 1/18/2023 Arvind AGUIAR  MD [place]          Follow-up Information       Follow up With Specialties Details Why Contact Info    [facility] - Emergency Dept Emergency Medicine  As needed, If symptoms worsen [id number]  [facility] [address] [phone]    Primary Care Physician  In 5 days               Arvind Dinh MD  01/08/23 1650

## 2024-04-02 ENCOUNTER — OFFICE VISIT (OUTPATIENT)
Dept: ORTHOPEDICS | Facility: CLINIC | Age: 59
End: 2024-04-02
Payer: COMMERCIAL

## 2024-04-02 DIAGNOSIS — S52.132A CLOSED DISPLACED FRACTURE OF NECK OF LEFT RADIUS, INITIAL ENCOUNTER: ICD-10-CM

## 2024-04-02 DIAGNOSIS — S52.125A CLOSED NONDISPLACED FRACTURE OF HEAD OF LEFT RADIUS, INITIAL ENCOUNTER: Primary | ICD-10-CM

## 2024-04-02 PROCEDURE — 99999 PR PBB SHADOW E&M-EST. PATIENT-LVL III: CPT | Mod: PBBFAC,,, | Performed by: PHYSICIAN ASSISTANT

## 2024-04-02 PROCEDURE — 99203 OFFICE O/P NEW LOW 30 MIN: CPT | Mod: 25,S$GLB,, | Performed by: PHYSICIAN ASSISTANT

## 2024-04-02 PROCEDURE — 1159F MED LIST DOCD IN RCRD: CPT | Mod: CPTII,S$GLB,, | Performed by: PHYSICIAN ASSISTANT

## 2024-04-02 PROCEDURE — 3044F HG A1C LEVEL LT 7.0%: CPT | Mod: CPTII,S$GLB,, | Performed by: PHYSICIAN ASSISTANT

## 2024-04-02 PROCEDURE — 29105 APPLICATION LONG ARM SPLINT: CPT | Mod: LT,S$GLB,, | Performed by: ORTHOPAEDIC SURGERY

## 2024-04-02 PROCEDURE — 1160F RVW MEDS BY RX/DR IN RCRD: CPT | Mod: CPTII,S$GLB,, | Performed by: PHYSICIAN ASSISTANT

## 2024-04-02 NOTE — PROGRESS NOTES
Chief Complaint & History of Present Illness:    Mikaela Oneil is a New patient 58 y.o. female who is seen here today with a complaint of    Chief Complaint   Patient presents with    Left Elbow - Injury, Pain    .  Patient is here today is a follow-up visit from the emergency room 03/30/2024 for a radial head fracture was placed in a posterior splint and sling is here today for continuing care periods patient states she is she fell on an outstretched arm landing on the elbow and hand developed immediate pain decrease in range of motion.  On a scale of 1-10, with 10 being worst pain imaginable, he rates this pain as 5 on good days and 8 on bad days.  she describes the pain as tender and sore.    Review of patient's allergies indicates:   Allergen Reactions    Azithromycin Nausea Only         Current Outpatient Medications   Medication Sig Dispense Refill    oxyCODONE-acetaminophen (PERCOCET) 5-325 mg per tablet Take 1 tablet by mouth every 6 (six) hours as needed for Pain. 12 tablet 0    neomycin-polymyxin-hydrocortisone (CORTISPORIN) 3.5-10,000-1 mg/mL-unit/mL-% otic suspension Place 3 drops into the right ear 3 (three) times daily. 10 mL 0     No current facility-administered medications for this visit.       No past medical history on file.    Past Surgical History:   Procedure Laterality Date    gastric sleeve      HYSTERECTOMY      INCONTINENCE SURGERY      THYROID SURGERY         Vital Signs (Most Recent)  There were no vitals filed for this visit.        Review of Systems:  ROS:  Constitutional: no fever or chills  Eyes: no visual changes  ENT: no nasal congestion or sore throat  Respiratory: no cough or shortness of breath  Cardiovascular: no chest pain or palpitations  Gastrointestinal: no nausea or vomiting, tolerating diet  Genitourinary: no hematuria or dysuria  Integument/Breast: no rash or pruritis  Hematologic/Lymphatic: no easy bruising or lymphadenopathy  Musculoskeletal: no arthralgias or  myalgias  Neurological: no seizures or tremors  Behavioral/Psych: no auditory or visual hallucinations  Endocrine: no heat or cold intolerance                OBJECTIVE:     PHYSICAL EXAM:     , General Appearance: Well nourished, well developed, in no acute distress.  Neurological: Mood & affect are normal.  left  Elbow:   History of injury: direct injury, fall on elbow  Pain: Description: moderate and intermittent  Night pain: yes and moderate  Rest pain: yes and moderate  Quality: sharp and stabbing  Location: anterior  Radiates to: arm  Mass/swelling:  None  Neurological complaints: none  Vascular complaints: none  reduced range of motion of of the elbow secondary to fracture, radial pulse normal  ROM: flexion arc 10-95  Strength:  Not tested secondary to fracture      RADIOGRAPHS:  X-rays from emergency room demonstrate mildly displaced anterior posterior elbow fat pads angulation of the radial head noting an impacted fracture at the neck    ASSESSMENT/PLAN:       ICD-10-CM ICD-9-CM   1. Closed nondisplaced fracture of head of left radius, initial encounter  S52.125A 813.05   2. Closed displaced fracture of neck of left radius, initial encounter  S52.132A 813.06       Plan: We discussed with the patient at length all the different treatment options available for her elbow including anti-inflammatories, acetaminophen, rest, ice, physical therapy to include strengthening, range of motion exercise,  splinting,  occasional cortisone injections for temporary relief,  or possible surgical interventions.  New plaster posterior splint was put in place patient will follow up in 10 days for reassessment sooner symptoms dictate

## 2024-04-08 ENCOUNTER — CLINICAL SUPPORT (OUTPATIENT)
Dept: ORTHOPEDICS | Facility: CLINIC | Age: 59
End: 2024-04-08
Payer: COMMERCIAL

## 2024-04-08 DIAGNOSIS — S52.132A CLOSED DISPLACED FRACTURE OF NECK OF LEFT RADIUS, INITIAL ENCOUNTER: Primary | ICD-10-CM

## 2024-04-08 NOTE — PROGRESS NOTES
Patient presented to cast room with a chief complaint of pain in small finger while in splint. After speaking with Mikaela, she stated the splint was only bothering her around her small finger, everything else was comfortable and she would prefer the option of trimming it as opposed to removing and replacing if possible. Splint was trimmed around the fingers to open up more space without compromising the splint. Patient reported relief, all questions were answered, patient will follow up as scheduled.

## 2024-04-12 ENCOUNTER — OFFICE VISIT (OUTPATIENT)
Dept: ORTHOPEDICS | Facility: CLINIC | Age: 59
End: 2024-04-12
Payer: COMMERCIAL

## 2024-04-12 ENCOUNTER — HOSPITAL ENCOUNTER (OUTPATIENT)
Dept: RADIOLOGY | Facility: HOSPITAL | Age: 59
Discharge: HOME | End: 2024-04-12
Attending: PHYSICIAN ASSISTANT
Payer: COMMERCIAL

## 2024-04-12 DIAGNOSIS — S52.125D CLOSED NONDISPLACED FRACTURE OF HEAD OF LEFT RADIUS WITH ROUTINE HEALING, SUBSEQUENT ENCOUNTER: ICD-10-CM

## 2024-04-12 DIAGNOSIS — S52.125D CLOSED NONDISPLACED FRACTURE OF HEAD OF LEFT RADIUS WITH ROUTINE HEALING, SUBSEQUENT ENCOUNTER: Primary | ICD-10-CM

## 2024-04-12 PROCEDURE — 73080 X-RAY EXAM OF ELBOW: CPT | Mod: 26,LT,, | Performed by: RADIOLOGY

## 2024-04-12 PROCEDURE — 1160F RVW MEDS BY RX/DR IN RCRD: CPT | Mod: CPTII,S$GLB,, | Performed by: PHYSICIAN ASSISTANT

## 2024-04-12 PROCEDURE — 3044F HG A1C LEVEL LT 7.0%: CPT | Mod: CPTII,S$GLB,, | Performed by: PHYSICIAN ASSISTANT

## 2024-04-12 PROCEDURE — 73080 X-RAY EXAM OF ELBOW: CPT | Mod: TC,LT

## 2024-04-12 PROCEDURE — 99214 OFFICE O/P EST MOD 30 MIN: CPT | Mod: S$GLB,,, | Performed by: PHYSICIAN ASSISTANT

## 2024-04-12 PROCEDURE — 1159F MED LIST DOCD IN RCRD: CPT | Mod: CPTII,S$GLB,, | Performed by: PHYSICIAN ASSISTANT

## 2024-04-12 PROCEDURE — 99999 PR PBB SHADOW E&M-EST. PATIENT-LVL III: CPT | Mod: PBBFAC,,, | Performed by: PHYSICIAN ASSISTANT

## 2024-04-12 NOTE — PROGRESS NOTES
Expand All Collapse All    Chief Complaint & History of Present Illness:     Mikaela Oneil is a Established patient 58 y.o. female who is seen here today with a complaint of        Chief Complaint   Patient presents with    Left Elbow - Injury, Pain    .  She is patient well-known to me suffering from a minimally displaced impacted radial head fracture of the left elbow she  was last seen treated the clinic for this condition 04/02/2024.  At that time she would placed in a posterior splint for support protection.  To some degree but it is  and sore.  Swelling has been well controlled with elevation and rest  On a scale of 1-10, with 10 being worst pain imaginable, he rates this pain as 4 on good days and 7 on bad days.  she describes the pain as tender and sore.          Review of patient's allergies indicates:   Allergen Reactions    Azithromycin Nausea Only          Current Medications          Current Outpatient Medications   Medication Sig Dispense Refill    oxyCODONE-acetaminophen (PERCOCET) 5-325 mg per tablet Take 1 tablet by mouth every 6 (six) hours as needed for Pain. 12 tablet 0    neomycin-polymyxin-hydrocortisone (CORTISPORIN) 3.5-10,000-1 mg/mL-unit/mL-% otic suspension Place 3 drops into the right ear 3 (three) times daily. 10 mL 0      No current facility-administered medications for this visit.            History reviewed. No pertinent past medical history.           Past Surgical History:   Procedure Laterality Date    gastric sleeve        HYSTERECTOMY        INCONTINENCE SURGERY        THYROID SURGERY             Vital Signs (Most Recent)  There were no vitals filed for this visit.           Review of Systems:  ROS:  Constitutional: no fever or chills  Eyes: no visual changes  ENT: no nasal congestion or sore throat  Respiratory: no cough or shortness of breath  Cardiovascular: no chest pain or palpitations  Gastrointestinal: no nausea or vomiting, tolerating diet  Genitourinary: no  hematuria or dysuria  Integument/Breast: no rash or pruritis  Hematologic/Lymphatic: no easy bruising or lymphadenopathy  Musculoskeletal: no arthralgias or myalgias  Neurological: no seizures or tremors  Behavioral/Psych: no auditory or visual hallucinations  Endocrine: no heat or cold intolerance                  OBJECTIVE:      PHYSICAL EXAM:     , General Appearance: Well nourished, well developed, in no acute distress.  Neurological: Mood & affect are normal.  left  Elbow:   History of injury: direct injury, fall on elbow  Pain: Description: mild, moderate, and intermittent  Night pain: yes and mild  Rest pain: no  Quality: sharp  Location: medial and anterior  Radiates to: arm  Exacerbating factors: activity  Mass/swelling:  Minimal  Neurological complaints: none  soft tissue tenderness and swelling at the radial head  ROM: flexion arc 5-135, pronation 80, and supination 80  Strength:  Not tested secondary to fracture        RADIOGRAPHS:  Repeat x-rays taken today films reviewed by me demonstrate no change in angulation or rotation of the fracture     ASSESSMENT/PLAN:          ICD-10-CM ICD-9-CM   1. Closed nondisplaced fracture of head of left radius with routine healing, subsequent encounter  S52.125D V54.12         Plan: We discussed with the patient at length all the different treatment options available for her elbow including anti-inflammatories, acetaminophen, rest, ice, physical therapy to include strengthening, range of motion exercise,  splinting,  occasional cortisone injections for temporary relief,  or possible surgical interventions.  We will discontinue the posterior splint at this point place patient in arm immobilizer she rarely move forward showering and sleeping as needed.  She may perform active range of motion exercises within the range of her pain.        Patient arrived in the cast room today for the removal of a long arm posterior splint with plaster. Splint was removed and skin  appeared in tact with a very small bruise in the palm of the hand and the skin appears red on the arm.

## 2024-04-12 NOTE — PROGRESS NOTES
Chief Complaint & History of Present Illness:    Mikaela Oneil is a Established patient 58 y.o. female who is seen here today with a complaint of    Chief Complaint   Patient presents with    Left Elbow - Injury, Pain    .  She is patient well-known to me suffering from a minimally displaced impacted radial head fracture of the left elbow she  was last seen treated the clinic for this condition 04/02/2024.  At that time she would placed in a posterior splint for support protection.  To some degree but it is  and sore.  Swelling has been well controlled with elevation and rest  On a scale of 1-10, with 10 being worst pain imaginable, he rates this pain as 4 on good days and 7 on bad days.  she describes the pain as tender and sore.    Review of patient's allergies indicates:   Allergen Reactions    Azithromycin Nausea Only         Current Outpatient Medications   Medication Sig Dispense Refill    oxyCODONE-acetaminophen (PERCOCET) 5-325 mg per tablet Take 1 tablet by mouth every 6 (six) hours as needed for Pain. 12 tablet 0    neomycin-polymyxin-hydrocortisone (CORTISPORIN) 3.5-10,000-1 mg/mL-unit/mL-% otic suspension Place 3 drops into the right ear 3 (three) times daily. 10 mL 0     No current facility-administered medications for this visit.       History reviewed. No pertinent past medical history.    Past Surgical History:   Procedure Laterality Date    gastric sleeve      HYSTERECTOMY      INCONTINENCE SURGERY      THYROID SURGERY         Vital Signs (Most Recent)  There were no vitals filed for this visit.        Review of Systems:  ROS:  Constitutional: no fever or chills  Eyes: no visual changes  ENT: no nasal congestion or sore throat  Respiratory: no cough or shortness of breath  Cardiovascular: no chest pain or palpitations  Gastrointestinal: no nausea or vomiting, tolerating diet  Genitourinary: no hematuria or dysuria  Integument/Breast: no rash or pruritis  Hematologic/Lymphatic: no easy  bruising or lymphadenopathy  Musculoskeletal: no arthralgias or myalgias  Neurological: no seizures or tremors  Behavioral/Psych: no auditory or visual hallucinations  Endocrine: no heat or cold intolerance                OBJECTIVE:     PHYSICAL EXAM:     , General Appearance: Well nourished, well developed, in no acute distress.  Neurological: Mood & affect are normal.  left  Elbow:   History of injury: direct injury, fall on elbow  Pain: Description: mild, moderate, and intermittent  Night pain: yes and mild  Rest pain: no  Quality: sharp  Location: medial and anterior  Radiates to: arm  Exacerbating factors: activity  Mass/swelling:  Minimal  Neurological complaints: none  soft tissue tenderness and swelling at the radial head  ROM: flexion arc 5-135, pronation 80, and supination 80  Strength:  Not tested secondary to fracture      RADIOGRAPHS:  Repeat x-rays taken today films reviewed by me demonstrate no change in angulation or rotation of the fracture    ASSESSMENT/PLAN:       ICD-10-CM ICD-9-CM   1. Closed nondisplaced fracture of head of left radius with routine healing, subsequent encounter  S52.125D V54.12       Plan: We discussed with the patient at length all the different treatment options available for her elbow including anti-inflammatories, acetaminophen, rest, ice, physical therapy to include strengthening, range of motion exercise,  splinting,  occasional cortisone injections for temporary relief,  or possible surgical interventions.  We will discontinue the posterior splint at this point place patient in arm immobilizer she rarely move forward showering and sleeping as needed.  She may perform active range of motion exercises within the range of her pain.      Patient arrived in the cast room today for the removal of a long arm posterior splint with plaster. Splint was removed and skin appeared in tact with a very small bruise in the palm of the hand and the skin appears red on the arm.

## 2024-04-16 ENCOUNTER — TELEPHONE (OUTPATIENT)
Dept: GASTROENTEROLOGY | Facility: CLINIC | Age: 59
End: 2024-04-16
Payer: COMMERCIAL

## 2024-04-16 RX ORDER — SOD SULF/POT CHLORIDE/MAG SULF 1.479 G
12 TABLET ORAL DAILY
Qty: 24 TABLET | Refills: 0 | Status: CANCELLED | OUTPATIENT
Start: 2024-04-16

## 2024-04-16 NOTE — TELEPHONE ENCOUNTER
Contacted patient to schedule a colonoscopy. Patient will probably have surgery on her elbow. Wants to wait until she hears back from doctor about that.

## 2024-04-18 ENCOUNTER — TELEPHONE (OUTPATIENT)
Dept: ORTHOPEDICS | Facility: CLINIC | Age: 59
End: 2024-04-18
Payer: COMMERCIAL

## 2024-04-18 NOTE — TELEPHONE ENCOUNTER
Spoke with patient stating Erich Mcfarlane will give her a call back regarding a follow up appointment in orthopedics or we will schedule her an appointment in sports medicine .

## 2024-04-25 ENCOUNTER — OFFICE VISIT (OUTPATIENT)
Dept: ORTHOPEDICS | Facility: CLINIC | Age: 59
End: 2024-04-25
Payer: COMMERCIAL

## 2024-04-25 ENCOUNTER — CLINICAL SUPPORT (OUTPATIENT)
Dept: REHABILITATION | Facility: HOSPITAL | Age: 59
End: 2024-04-25
Payer: COMMERCIAL

## 2024-04-25 ENCOUNTER — HOSPITAL ENCOUNTER (OUTPATIENT)
Dept: RADIOLOGY | Facility: HOSPITAL | Age: 59
Discharge: HOME | End: 2024-04-25
Attending: PHYSICIAN ASSISTANT
Payer: COMMERCIAL

## 2024-04-25 DIAGNOSIS — S52.125D CLOSED NONDISPLACED FRACTURE OF HEAD OF LEFT RADIUS WITH ROUTINE HEALING, SUBSEQUENT ENCOUNTER: Primary | ICD-10-CM

## 2024-04-25 DIAGNOSIS — S52.125D CLOSED NONDISPLACED FRACTURE OF HEAD OF LEFT RADIUS WITH ROUTINE HEALING, SUBSEQUENT ENCOUNTER: ICD-10-CM

## 2024-04-25 DIAGNOSIS — M25.60 RANGE OF MOTION DEFICIT: ICD-10-CM

## 2024-04-25 DIAGNOSIS — M79.89 HAND SWELLING: Primary | ICD-10-CM

## 2024-04-25 PROCEDURE — 73080 X-RAY EXAM OF ELBOW: CPT | Mod: TC,LT

## 2024-04-25 PROCEDURE — 97165 OT EVAL LOW COMPLEX 30 MIN: CPT

## 2024-04-25 PROCEDURE — 99203 OFFICE O/P NEW LOW 30 MIN: CPT | Mod: S$GLB,,, | Performed by: PHYSICIAN ASSISTANT

## 2024-04-25 PROCEDURE — 99999 PR PBB SHADOW E&M-EST. PATIENT-LVL III: CPT | Mod: PBBFAC,,, | Performed by: PHYSICIAN ASSISTANT

## 2024-04-25 PROCEDURE — 97530 THERAPEUTIC ACTIVITIES: CPT

## 2024-04-25 PROCEDURE — 1159F MED LIST DOCD IN RCRD: CPT | Mod: CPTII,S$GLB,, | Performed by: PHYSICIAN ASSISTANT

## 2024-04-25 PROCEDURE — 3044F HG A1C LEVEL LT 7.0%: CPT | Mod: CPTII,S$GLB,, | Performed by: PHYSICIAN ASSISTANT

## 2024-04-25 PROCEDURE — 73080 X-RAY EXAM OF ELBOW: CPT | Mod: 26,LT,, | Performed by: RADIOLOGY

## 2024-04-25 RX ORDER — MELOXICAM 15 MG/1
15 TABLET ORAL DAILY
Qty: 30 TABLET | Refills: 0 | Status: SHIPPED | OUTPATIENT
Start: 2024-04-25 | End: 2024-05-28

## 2024-04-25 NOTE — PLAN OF CARE
[facility]  Occupational Therapy Initial Evaluation     Name: Mikaela Oneil  Clinic Number: [id number]    Therapy Diagnosis:   Encounter Diagnoses   Name Primary?    Closed nondisplaced fracture of head of left radius with routine healing, subsequent encounter     Hand swelling Yes    Range of motion deficit      Physician: Russo-Digeorge, Jamie L*    Physician Orders: Eval and Treat  Medical Diagnosis: Closed nondisplaced fracture of head of left radius with routine healing, subsequent encounter [K12.561H]   Surgical Procedure and Date: n/a  Onset Date: 3/30/24  Evaluation Date: 4/25/2024  Insurance Authorization Period Expiration: 4/25/25  Plan of Care Certification Period: 6/21/24  Date of Return to MD: 5/8/24  Visit # / Visits authorized: 1 / 1    FOTO: 0/3, FOTO TBA at next session    Precautions:  Standard and Weightbearing    Time In:9:30  Time Out: 10:15  Total Appointment Time (timed & untimed codes): 45 minutes    Subjective     Date of Onset: 3/30/24    History of Current Condition/Mechanism of Injury: Mikaela reports: she injured her arm when she fell and tripped on a large paving stone at a park. She was then casted for 2 weeks. She has not been able to move her arm very much and has swelling in L hand. She has been trying to do gentle stretches to fingers and touch thumb to fingers as able. She has c/o pain, numbness, swelling, and limited function.     Falls: yes    Involved Side: left  Dominant Side: Right    Mechanism of Injury: fall when tripped at a park  Surgical Procedure: none  Imaging: see chart for current x-ray.   X-ray on 4/12/24 indicated: CLINICAL HISTORY:  Nondisplaced fracture of head of left radius, subsequent encounter for closed fracture with routine healing     TECHNIQUE:  AP, lateral, and oblique views of the left elbow were performed.     COMPARISON:  No 03/30/2024 ne     FINDINGS:  Healing fracture of the radial neck identified in a satisfactory and  unchanged position as compared to the previous study    Prior Therapy: no    Pain:  Functional Pain Scale Rating 0-10:   0/10 on average  0/10 at best  8/10 at worst  Location: left wrist and hand  Description: Tingling and Numb  Aggravating Factors: Night Time and sleeping on it  Easing Factors:  pressure and massage    Occupation:  is a   Working presently: employed  Duties: typing, computer use,     Functional Limitations/Social History:    Previous functional status includes: Independent with all ADLs.      Current Functional Status   Home/Living environment: lives with their family         Limitation of Functional Status as follows:   ADLs/IADLs:     - Feeding: I    - Bathing: I    - Dressing/Grooming: mod I, but difficulty with socks    - Home Management: is washing dishes with 1 hand, but getting assist    - Driving: is driving, mainly 1 hand     Leisure: bowling club, has not been able to    Patient's Goals for Therapy: to get my extremity back    Past Medical History/Physical Systems Review:   Mikaela Oneil  has no past medical history on file.    Mikaela Oneil  has a past surgical history that includes gastric sleeve; Hysterectomy; Thyroid surgery; and Incontinence surgery.    Mikaela has a current medication list which includes the following prescription(s): meloxicam.    Review of patient's allergies indicates:   Allergen Reactions    Azithromycin Nausea Only        Objective     Observation/Appearance:  moderate swelling and stiffness noted in fingers and hand. Stiffness noted in elbow and     Edema. Measured in centimeters.   4/25/2024 4/25/2024    Right  Left    2in. Above elbow 39.7 40.5   Elbow crease 31.2 31.5   2in. Below elbow 30.2 29   Proximal Wrist Crease 17.5 18.7   DPC 20.7 21.3   MCPs 20 20.7         Elbow and Wrist ROM. Measured in degrees.   4/25/2024 4/25/2024    Right Left   Elbow Ext/Flex WFL -50/110   Supination/Pronation WFL 15/70   Wrist Ext/Flex WFL 35/28   Wrist RD/UD  WFL        8/20       Hand ROM. Measured in degrees.  R hand WFL   4/25/2024    Left        Index: MP  28              PIP     77              DIP 35              MADRIGAL 140       Long:  MP 30              PIP 40              DIP 7              MADRIGAL 77       Ring:   MP 25              PIP 38              DIP 7              MADRIGAL 70       Small:  MP 17               PIP 25               DIP 25              MADRIGAL 67       Thumb: MP 32                IP 52       Rad ADD/ABD 38       Pal ADD/ABD 40          Opposition To tip of IF     Sensation: reports numbness and tingling     Strength (Dyanmometer) and Pinch Strength (Pinch Gauge)  Measured in pounds and psi. Average of three trials.   4/25/2024 4/25/2024    Right  Left    Rung II NT NT   Key Pinch     3pt Pinch     2pt Pinch         Manual Muscle Testing    Manual Muscle Test: NT   4/25/2024 4/25/2024    Left Right   Wrist Extension      Wrist Flexion     Radial Deviation     Ulnar Deviation     Supination     Pronation     Elbow Extension     Elbow Flexion             CMS Impairment/Limitation/Restriction for FOTO Elbow Survey    FOTO scores for Mikaela Oneil on 4/25/2024.   FOTO documents entered into [employer] - see Media section.    Intake Score: TBA           Treatment     Total Treatment time separate from Evaluation time:20 minutes    Mikaela received the treatments listed below:        therapeutic activities to improve functional performance for 20  minutes, including:  Reviewed precautions of no heavier lifting, pushing, pulling, and no weightbearing through L arm  Educated on manual lymphatic drainage and retrograde massage to perform at home daily.   Educated on HEP and performed as follows:   AROM   Elbow flex/extension 3 ways as able  Sup/Pro  Wrist  Ext/flx  RD/UD    X 5-10 reps each    A/AAROM   Spreads  Wave  Hook  Straight fist  Composite fist  Lifts X 5-10 reps each (given yellow sponge wedges)   A/AAROM thumb:  Rad abd/add  Palmar abd/add  IP  blocks  Opposition      X 5-10 reps each          Patient Education and Home Exercises      Education provided:   -role of OT, goals for OT, scheduling/cancellations, insurance limitations with patient.  -Additional Education provided:   -educated on precautions, edema management, and HEP as above  -discussed trying to incorporate L hand and arm for mos tight tasks as able.     Written Home Exercises Provided: yes.  Exercises were reviewed and Mikaela was able to demonstrate them prior to the end of the session.    Mikaela demonstrated good  understanding of the education provided.     Pt was advised to perform these exercises free of pain, and to stop performing them if pain occurs.    See EMR under Patient Instructions for exercises provided 4/25/2024.    Assessment     Mikaela Oneil is a 58 y.o. female referred to outpatient occupational therapy and presents with a medical diagnosis of closed left radial neck fx.    Following medical record review it is determined that pt will benefit from occupational therapy services in order to maximize pain free and/or functional use of left arm. The following goals were discussed with the patient and patient is in agreement with them as to be addressed in the treatment plan. The patient's rehab potential is Fair.     Anticipated barriers to occupational therapy: swelling, and stiffness    Plan of care discussed with patient: Yes  Patient's spiritual, cultural and educational needs considered and patient is agreeable to the plan of care and goals as stated below:     Medical Necessity is demonstrated by the following  Occupational Profile/History  Co-morbidities and personal factors that may impact the plan of care [x] LOW: Brief chart review  [] MODERATE: Expanded chart review   [] HIGH: Extensive chart review    Moderate / High Support Documentation:       Examination  Performance deficits relating to physical, cognitive or psychosocial skills that result in activity  limitations and/or participation restrictions  [] LOW: addressing 1-3 Performance deficits  [] MODERATE: 3-5 Performance deficits  [x] HIGH: 5+ Performance deficits (please support below)    Moderate / High Support Documentation:    Physical:  Joint Mobility  Muscle Power/Strength  Edema   Strength  Fine Motor Coordination  Pain    Cognitive:  No Deficits    Psychosocial:    Habits  Routines     Treatment Options [] LOW: Limited options  [] MODERATE: Several options  [x] HIGH: Multiple options     min assistance required     Decision Making/ Complexity Score: low       The following goals were discussed with the patient and patient is in agreement with them as to be addressed in the treatment plan.     Goals:   Long Term Goals (LTGs); to be met by discharge.  1) Pt will report pain no higher than 2/10 with daily tasks and getting back to bowling  2) Pt will report return to prior level of function  3) Pt will demonstrate improved left elbow AROM WFL for performing grooming tasks and other daily activities  4) Pt will demonstrate improved L wrist and hand AROM WFL for grasping and performing daily tasks  5)  strength to be assessed when appropriate at 6 weeks and set goals    Short Term Goals (STGs); to be met within 4 weeks (5/25/24).  1) Pt will report or demonstrate independence with HEP and edema management  2) Pt will report pain no higher than 5/10 with daily tasks  3) FOTO to be assessed at next session and set goals  4) Pt will demonstrate improved left elbow and forearm AROM by at least 20 degrees for improved function with self care  5) Pt will demonstrate improved left digits AROM by at least 30-50 degrees for improved grasp and typing at work.   6) Pt will demonstrate improved edema in left hand and wrist by at least 0.3-0.5 cm for increased motion and grasp      Plan   Certification Period/Plan of care expiration: 4/25/2024 to 6/21/24.    Outpatient Occupational Therapy 2-3 times weekly for 8  weeks to include the following interventions: Paraffin, Fluidotherapy, Manual therapy/joint mobilizations, Modalities for pain management, US 3 mhz, Therapeutic exercises/activities., Strengthening, Orthotic Fabrication/Fit/Training, Edema Control, Electrical Modalities, Joint Protection, and Energy Conservation. Pt to be scheduled 2x/week at this time. FOTO to be assessed at next session    BRIAN Fonseca, WES      I certify the need for these services furnished under the plan of treatment and while under my care.     ____________________________________________________________________  Physician/Referring Practitioner   Date of Signature

## 2024-04-25 NOTE — PROGRESS NOTES
[facility]  History & Physical  Orthopedics    SUBJECTIVE:      Chief Complaint: Left elbow injury    Referring Provider: Luis Mcfarlane PA*     Dr. Garcia is the supervising physician for this encounter/patient    History of Present Illness:  Patient is a 58 y.o. right hand dominant female who presents today with complaints of left elbow injury occurred on 3/30/24 when she tripped/fell. She was seen in the ED initially and splinted for radial neck fracture. She then saw Orthopedics and was transitioned to long arm splint which was maintained for 2 weeks. She reports 9/10 pain, takes Tylenol for pain. Swelling and limited ROM of the arm.     Onset of symptoms/DOI was 3/30/24.    Symptoms are aggravated by activity and movement.    Symptoms are alleviated by rest.    Symptoms consist of pain, swelling, and decreased ROM.    The patient rates their pain as a 9/10.    Attempted treatment(s) and/or interventions include activity modifications, rest, splinting/casting.     The patient denies any fevers, chills, N/V, D/C and presents for evaluation.       No past medical history on file.  Past Surgical History:   Procedure Laterality Date    gastric sleeve      HYSTERECTOMY      INCONTINENCE SURGERY      THYROID SURGERY       Review of patient's allergies indicates:   Allergen Reactions    Azithromycin Nausea Only     Social History     Social History Narrative    Not on file     Family History   Problem Relation Name Age of Onset    Cancer Paternal Grandmother      Cancer Father      Diabetes Mother      Breast cancer Paternal Aunt       labor Daughter           Current Outpatient Medications:     meloxicam (MOBIC) 15 MG tablet, Take 1 tablet (15 mg total) by mouth once daily., Disp: 30 tablet, Rfl: 0      Review of Systems:  Constitutional: no fever or chills  Eyes: no visual changes  ENT: no nasal congestion or sore throat  Respiratory: no cough or shortness of breath  Cardiovascular:  no chest pain  Gastrointestinal: no nausea or vomiting, tolerating diet  Musculoskeletal: pain, soreness, and decreased ROM    OBJECTIVE:      Vital Signs (Most Recent):  There were no vitals filed for this visit.  There is no height or weight on file to calculate BMI.      Physical Exam:  Constitutional: The patient appears well-developed and well-nourished. No distress.   Skin: No lesions appreciated  Head: Normocephalic and atraumatic.   Nose: Nose normal.   Ears: No deformities seen  Eyes: Conjunctivae and EOM are normal.   Neck: No tracheal deviation present.   Cardiovascular: Normal rate and intact distal pulses.    Pulmonary/Chest: Effort normal. No respiratory distress.   Abdominal: There is no guarding.   Neurological: The patient is alert.   Psychiatric: The patient has a normal mood and affect.     Left Elbow/Hand/Wrist Examination:    Observation/Inspection:  Swelling  Generalized edema hand/wrist    Deformity  none  Discoloration  none     Scars   none    Atrophy  none    HAND/WRIST EXAMINATION:  Finkelstein's Test   Neg  WHAT Test    Neg  Snuff box tenderness   Neg  Abbott's Test    Neg  Hook of Hamate Tenderness  Neg  CMC grind    Neg  Circumduction test   Neg    Neurovascular Exam:  Digits WWP, brisk CR < 3s throughout  NVI motor/LTS to M/R/U nerves, radial pulse 2+  Tinel's Test - Carpal Tunnel  Neg  Tinel's Test - Cubital Tunnel  Neg  Phalen's Test    Neg  Median Nerve Compression Test Neg    ROM hand: unable to make a fist    ROM wrist: limited due to pain/swelling    ROM elbow: extension to -50, flexion to 90, limited supination, full pronation    Abdomen not guarded  Respirations nonlabored  Perfusion intact    Diagnostic Results:     Imaging - I independently viewed the patient's imaging as well as the radiology report.  Xrays of the patient's left elbow  demonstrates stable alignment of radial neck fracture, no intra-articular component noted. Healing changes.    EMG - none    ASSESSMENT/PLAN:       58 y.o. yo female with Left impacted radial neck fracture, stiff from splinting    Plan: The patient and I had a thorough discussion today.  We discussed the working diagnosis as well as several other potential alternative diagnoses.  Xrays reviewed in detail with her today. No surgical indications. Urgent OT referral placed to focus on ROM. NWB through the elbow until 6 weeks from injury. Mobic 15mg ordered, ok to take Tylenol with this. Ice/heat. RTC in 2 weeks for repeat xrays and ROM check.    Should the patient's symptoms worsen, persist, or fail to improve they should return for reevaluation and I would be happy to see them back anytime.           Please do not hesitate to reach out to us via email, phone, or MyChart with any questions, concerns, or feedback.

## 2024-04-25 NOTE — PATIENT INSTRUCTIONS
Complete 10 repetitions of each exercise 4-6 times a day:                                        _

## 2024-05-01 ENCOUNTER — CLINICAL SUPPORT (OUTPATIENT)
Dept: REHABILITATION | Facility: HOSPITAL | Age: 59
End: 2024-05-01
Payer: COMMERCIAL

## 2024-05-01 DIAGNOSIS — M79.89 HAND SWELLING: Primary | ICD-10-CM

## 2024-05-01 DIAGNOSIS — M25.60 RANGE OF MOTION DEFICIT: ICD-10-CM

## 2024-05-01 PROCEDURE — 97110 THERAPEUTIC EXERCISES: CPT

## 2024-05-01 PROCEDURE — 97140 MANUAL THERAPY 1/> REGIONS: CPT

## 2024-05-01 NOTE — PROGRESS NOTES
[facility]  Occupational Therapy Treatment Note     Date: 5/1/2024  Name: Mikaela Oneil  Clinic Number: [id number]    Therapy Diagnosis:   Encounter Diagnoses   Name Primary?    Hand swelling Yes    Range of motion deficit      Physician: Russo-Digeorge, Jamie L*    Physician Orders: Eval and Treat  Medical Diagnosis: Closed nondisplaced fracture of head of left radius with routine healing, subsequent encounter [S52.125D]   Surgical Procedure and Date: n/a  Onset Date: 3/30/24  Evaluation Date: 4/25/2024  Insurance Authorization Period Expiration: 4/25/25  Plan of Care Certification Period: 6/21/24  Date of Return to MD: 5/8/24  Visit # / Visits authorized: 1 / 20     FOTO: 1/3, FOTO = 40.0168%     Precautions:  Standard and Weightbearing     Time In:4:05 PM  Time Out: 5:05 PM  Total Appointment Time (timed & untimed codes): 60 minutes    Subjective     Patient reports: She has not had much pain unless she instinctually uses the arm during active range of motion.   She was compliant with home exercise program given last session.   Response to previous treatment:sachin well  Functional change: no change this date.     Pain: 0/10  Location: left arms     Objective     Objective Measures updated at progress report unless specified.    Treatment     Mikaela received the treatments listed below:      therapeutic exercises to develop ROM and flexibility for 25 minutes including:  - WF/WE x 10  - Elbow active range of motion 3-ways: x 10 each  - FA active range of motion: sup/pro x 10  - Digit abduction/adduction x 10  - Extension lifts x 10    manual therapy techniques: Manual Lymphatic Drainage and Soft tissue Mobilization were applied to the: left hand, arm and elbow for 25 minutes, including:   - Retrograde massage x 12'  - Kinesio tape to promote decreased edema over digits 2-5 and onto the dorsal aspect of the hand    neuromuscular re-education activities to improve: - for 0 minutes. The  following activities were included:  NT    therapeutic activities to improve functional performance for 0  minutes, including:  NT    hot pack for 10 minutes to left elbow and hand with FOTO outcome measure taken simultaneously.     Patient Education and Home Exercises     Education provided:   - lymphatic system function  - inflammation purpose  - mind/body connection  - Progress towards goals     Written Home Exercises Provided: Patient instructed to cont prior HEP.  Exercises were reviewed and Mikaela was able to demonstrate them prior to the end of the session.  Mikaela demonstrated good  understanding of the home exercise program provided. See electronic medical record under Patient Instructions for exercises provided during therapy sessions.       Assessment     Fair performance of home exercise program this date with continued stiffness and swelling in the LUE. Improved digit range of motion and pain levels following manual therapy techniques for manual lymphatic drainage. The patient was instructed to remove kinesio tape if it impacts movement or causes increased pain prior to next scheduled session. Continue HEP with potential progression to AAROM per [place] protocol but changes to be discussed with evaluating therapist and referring MD.     Mikaela is progressing well towards her goals and there are no updates to goals at this time. Pt prognosis is Good.     Patient will continue to benefit from skilled outpatient occupational therapy to address the deficits listed in the problem list on initial evaluation provide patient/family education and to maximize patient's level of independence in the home and community environment.     Patient's spiritual, cultural and educational needs considered and patient agreeable to plan of care and goals.    Anticipated barriers to occupational therapy:     Goals:  Long Term Goals (LTGs); to be met by discharge.  1) Pt will report pain no higher than 2/10 with daily tasks and  getting back to bowling  2) Pt will report return to prior level of function  3) Pt will demonstrate improved left elbow AROM WFL for performing grooming tasks and other daily activities  4) Pt will demonstrate improved L wrist and hand AROM WFL for grasping and performing daily tasks  5)  strength to be assessed when appropriate at 6 weeks and set goals     Short Term Goals (STGs); to be met within 4 weeks (5/25/24).  1) Pt will report or demonstrate independence with HEP and edema management  2) Pt will report pain no higher than 5/10 with daily tasks  3) FOTO to be assessed at next session and set goals  4) Pt will demonstrate improved left elbow and forearm AROM by at least 20 degrees for improved function with self care  5) Pt will demonstrate improved left digits AROM by at least 30-50 degrees for improved grasp and typing at work.   6) Pt will demonstrate improved edema in left hand and wrist by at least 0.3-0.5 cm for increased motion and grasp    Plan     Updates/Grading for next session:   Continue OT POC per protocol    REMIGIO Byrne/PATRICIA   5/1/2024

## 2024-05-06 ENCOUNTER — CLINICAL SUPPORT (OUTPATIENT)
Dept: REHABILITATION | Facility: HOSPITAL | Age: 59
End: 2024-05-06
Payer: COMMERCIAL

## 2024-05-06 DIAGNOSIS — M25.60 RANGE OF MOTION DEFICIT: ICD-10-CM

## 2024-05-06 DIAGNOSIS — M79.89 HAND SWELLING: Primary | ICD-10-CM

## 2024-05-06 PROCEDURE — 97110 THERAPEUTIC EXERCISES: CPT

## 2024-05-06 PROCEDURE — 97140 MANUAL THERAPY 1/> REGIONS: CPT

## 2024-05-08 ENCOUNTER — OFFICE VISIT (OUTPATIENT)
Dept: ORTHOPEDICS | Facility: CLINIC | Age: 59
End: 2024-05-08
Payer: COMMERCIAL

## 2024-05-08 ENCOUNTER — HOSPITAL ENCOUNTER (OUTPATIENT)
Dept: RADIOLOGY | Facility: HOSPITAL | Age: 59
Discharge: HOME | End: 2024-05-08
Attending: PHYSICIAN ASSISTANT
Payer: COMMERCIAL

## 2024-05-08 ENCOUNTER — CLINICAL SUPPORT (OUTPATIENT)
Dept: REHABILITATION | Facility: HOSPITAL | Age: 59
End: 2024-05-08
Payer: COMMERCIAL

## 2024-05-08 DIAGNOSIS — M25.522 ELBOW PAIN, LEFT: ICD-10-CM

## 2024-05-08 DIAGNOSIS — M25.60 RANGE OF MOTION DEFICIT: ICD-10-CM

## 2024-05-08 DIAGNOSIS — S52.125D CLOSED NONDISPLACED FRACTURE OF HEAD OF LEFT RADIUS WITH ROUTINE HEALING, SUBSEQUENT ENCOUNTER: ICD-10-CM

## 2024-05-08 DIAGNOSIS — M25.522 ELBOW PAIN, LEFT: Primary | ICD-10-CM

## 2024-05-08 DIAGNOSIS — S52.125D CLOSED NONDISPLACED FRACTURE OF HEAD OF LEFT RADIUS WITH ROUTINE HEALING, SUBSEQUENT ENCOUNTER: Primary | ICD-10-CM

## 2024-05-08 DIAGNOSIS — M79.89 HAND SWELLING: Primary | ICD-10-CM

## 2024-05-08 PROCEDURE — 73080 X-RAY EXAM OF ELBOW: CPT | Mod: 26,LT,, | Performed by: RADIOLOGY

## 2024-05-08 PROCEDURE — 3044F HG A1C LEVEL LT 7.0%: CPT | Mod: CPTII,S$GLB,, | Performed by: PHYSICIAN ASSISTANT

## 2024-05-08 PROCEDURE — 73080 X-RAY EXAM OF ELBOW: CPT | Mod: TC,LT

## 2024-05-08 PROCEDURE — 99999 PR PBB SHADOW E&M-EST. PATIENT-LVL II: CPT | Mod: PBBFAC,,, | Performed by: PHYSICIAN ASSISTANT

## 2024-05-08 PROCEDURE — 97110 THERAPEUTIC EXERCISES: CPT

## 2024-05-08 PROCEDURE — 99213 OFFICE O/P EST LOW 20 MIN: CPT | Mod: S$GLB,,, | Performed by: PHYSICIAN ASSISTANT

## 2024-05-08 PROCEDURE — 97140 MANUAL THERAPY 1/> REGIONS: CPT

## 2024-05-08 NOTE — PROGRESS NOTES
[facility]  Occupational Therapy Treatment Note     Date: 5/8/2024  Name: Mikaela Oneil  [facility] Number: [id number]    Therapy Diagnosis:   No diagnosis found.      Physician: Russo-Digeorge, Jamie L*    Physician Orders: Eval and Treat  Medical Diagnosis: Closed nondisplaced fracture of head of left radius with routine healing, subsequent encounter [F30.360N]   Surgical Procedure and Date: n/a  Onset Date: 3/30/24  Evaluation Date: 4/25/2024  Insurance Authorization Period Expiration: 4/25/25  Plan of Care Certification Period: 6/21/24  Date of Return to MD: 5/8/24  Visit # / Visits authorized: 2 / 20     FOTO: 1/3, FOTO = 40.0168%    Lobby Access Code: [email or web address]      Precautions:  Standard and Weightbearing     Time In: 10:00 AM  Time Out: 11:00 AM  Total Appointment Time (timed & untimed codes): 58 minutes    Subjective     Patient reports: She is not in any pain but has had decreased sleep hygiene due to usually sleeping on her right side, which is painful as the elbow remains in flexion throughout the night.   She was compliant with home exercise program given last session.   Response to previous treatment:sachin well  Functional change: no change this date.     Pain: 0/10  Location: left arms     Objective     Elbow and Wrist ROM. Measured in degrees.    4/25/2024 4/25/2024 5/8/2024     Right Left Left  (Post-treatment)   Elbow Ext/Flex WFL -50/110 -25/133   Supination/Pronation WFL 15/70 67/90   Wrist Ext/Flex WFL 35/28 40/39   Wrist RD/UD WFL        8/20            10/20         Hand ROM. Measured in degrees.  R hand WFL    4/25/2024 5/8/2024     Left  Left  (Post-treatment)          Index: MP  28 40              PIP     77 89              DIP 35 38              MADRIGAL 140 167          Long:  MP 30 40              PIP 40 55              DIP 7 15              MADRIGAL 77 110          Ring:   MP 25 28              PIP 38 41              DIP 7 18              MADRIGAL 70 87          Small:  MP 17 39        "        PIP 25 20               DIP 25 20              MADRIGAL 67 79          Thumb: MP 32 35                IP 52 58       Rad ADD/ABD 38 40       Pal ADD/ABD 40 35          Opposition To tip of IF NT         Treatment     Mikaela received the treatments listed below:      therapeutic exercises to develop ROM and flexibility for 50 minutes including:  - WF/WE x 20  - Elbow active range of motion 3-ways: x 10 each  - Elbow flex/ext AAROM: x 10 with 5" hold in supine  - FA Sup/pro AAROM x 10 with 5" hold  - FA sup/pro with red therabar as a dowel x 10  - FA active range of motion: sup/pro x 10  - Digit abduction/adduction x 20  - Extension lifts x 10  - EF/EE in supine on tall mat x 10 with 5 second hold in extension   - In hand manipulation: large pom poms x 3 containers  - LLPS with MH for 8 minutes to left elbow in extension in supine with headboard raised at an angle (semi-fowlers position)  - objective measurements re-assessed this date.     manual therapy techniques: Manual Lymphatic Drainage and Soft tissue Mobilization were applied to the: left hand, arm and elbow for 10 minutes, including:  - Retrograde massage x 10'  - Kinesio tape to promote decreased edema over digits 2-5 and onto the dorsal aspect of the hand (NT)  - Tissue movers to left volar elbow and forearm in supine for improved tissue elasticity.     neuromuscular re-education activities to improve: - for 0 minutes. The following activities were included:  NT  therapeutic activities to improve functional performance for 0  minutes, including:  NT    Patient Education and Home Exercises     Education provided:   - Progress towards goals   - K tape technique for improved edema    Written Home Exercises Provided: Patient instructed to cont prior HEP.  Exercises were reviewed and Mikaela was able to demonstrate them prior to the end of the session.  Mikaela demonstrated good  understanding of the home exercise program provided. See electronic medical record " under Patient Instructions for exercises provided during therapy sessions.       Assessment     Good progression to perform E/EF in supine with improved range of motion an noted improvement in tissue elasticity. Good active range of motion gains this session but with measurements taken post-treatment. Continued edema but with noted improvement this date. However, no functional improvement with continued finger stiffness, especially for digits 3 and 4 on the effected limb. Continue HEP with potential progression to AAROM per [place] protocol but changes to be discussed with evaluating therapist and referring MD.     Mikaela is progressing well towards her goals and there are no updates to goals at this time. Pt prognosis is Good.     Patient will continue to benefit from skilled outpatient occupational therapy to address the deficits listed in the problem list on initial evaluation provide patient/family education and to maximize patient's level of independence in the home and community environment.     Patient's spiritual, cultural and educational needs considered and patient agreeable to plan of care and goals.    Anticipated barriers to occupational therapy:     Goals:  Long Term Goals (LTGs); to be met by discharge.  1) Pt will report pain no higher than 2/10 with daily tasks and getting back to bowling  2) Pt will report return to prior level of function  3) Pt will demonstrate improved left elbow AROM WFL for performing grooming tasks and other daily activities  4) Pt will demonstrate improved L wrist and hand AROM WFL for grasping and performing daily tasks  5)  strength to be assessed when appropriate at 6 weeks and set goals     Short Term Goals (STGs); to be met within 4 weeks (5/25/24).  1) Pt will report or demonstrate independence with HEP and edema management  2) Pt will report pain no higher than 5/10 with daily tasks  3) FOTO to be assessed at next session and set goals  4) Pt will demonstrate  improved left elbow and forearm AROM by at least 20 degrees for improved function with self care  5) Pt will demonstrate improved left digits AROM by at least 30-50 degrees for improved grasp and typing at work.   6) Pt will demonstrate improved edema in left hand and wrist by at least 0.3-0.5 cm for increased motion and grasp    Plan     Updates/Grading for next session:   Continue OT POC per protocol    Rigoberto Tomas OTR/L   5/8/2024

## 2024-05-08 NOTE — PROGRESS NOTES
[facility]  History & Physical  Orthopedics    SUBJECTIVE:      Chief Complaint: Left elbow injury    Referring Provider: No ref. provider found     Dr. Garcia is the supervising physician for this encounter/patient    History of Present Illness:  Patient is a 58 y.o. right hand dominant female who presents today with complaints of left elbow injury occurred on 3/30/24 when she tripped/fell. She was seen in the ED initially and splinted for radial neck fracture. She then saw Orthopedics and was transitioned to long arm splint which was maintained for 2 weeks. She reports 9/10 pain, takes Tylenol for pain. Swelling and limited ROM of the arm.     Interval History 24: the patient is now 5.5 weeks out from left elbow radial neck fracture. She was referred to OT for aggressive ROM of the elbow, and has gone to several visits since her initial visit with me. She is doing much better, swelling has improved as well as motion. She does take the mobic daily.    Onset of symptoms/DOI was 3/30/24.    Symptoms are aggravated by activity and movement.    Symptoms are alleviated by rest.    Symptoms consist of pain, swelling, and decreased ROM.    The patient rates their pain as a 0/10    Attempted treatment(s) and/or interventions include activity modifications, rest, splinting/casting, Mobic, OT.     The patient denies any fevers, chills, N/V, D/C and presents for evaluation.       No past medical history on file.  Past Surgical History:   Procedure Laterality Date    gastric sleeve      HYSTERECTOMY      INCONTINENCE SURGERY      THYROID SURGERY       Review of patient's allergies indicates:   Allergen Reactions    Azithromycin Nausea Only     Social History     Social History Narrative    Not on file     Family History   Problem Relation Name Age of Onset    Cancer Paternal Grandmother      Cancer Father      Diabetes Mother      Breast cancer Paternal Aunt       labor Daughter            Current Outpatient Medications:     meloxicam (MOBIC) 15 MG tablet, Take 1 tablet (15 mg total) by mouth once daily., Disp: 30 tablet, Rfl: 0      Review of Systems:  Constitutional: no fever or chills  Eyes: no visual changes  ENT: no nasal congestion or sore throat  Respiratory: no cough or shortness of breath  Cardiovascular: no chest pain  Gastrointestinal: no nausea or vomiting, tolerating diet  Musculoskeletal: pain, soreness, and decreased ROM    OBJECTIVE:      Vital Signs (Most Recent):  There were no vitals filed for this visit.  There is no height or weight on file to calculate BMI.      Physical Exam:  Constitutional: The patient appears well-developed and well-nourished. No distress.   Skin: No lesions appreciated  Head: Normocephalic and atraumatic.   Nose: Nose normal.   Ears: No deformities seen  Eyes: Conjunctivae and EOM are normal.   Neck: No tracheal deviation present.   Cardiovascular: Normal rate and intact distal pulses.    Pulmonary/Chest: Effort normal. No respiratory distress.   Abdominal: There is no guarding.   Neurological: The patient is alert.   Psychiatric: The patient has a normal mood and affect.     Left Elbow/Hand/Wrist Examination:    Observation/Inspection:  Swelling  Generalized edema hand/wrist though improved    Deformity  none  Discoloration  none     Scars   none    Atrophy  none    HAND/WRIST EXAMINATION:  Finkelstein's Test   Neg  WHAT Test    Neg  Snuff box tenderness   Neg  Abbott's Test    Neg  Hook of Hamate Tenderness  Neg  CMC grind    Neg  Circumduction test   Neg    Neurovascular Exam:  Digits WWP, brisk CR < 3s throughout  NVI motor/LTS to M/R/U nerves, radial pulse 2+  Tinel's Test - Carpal Tunnel  Neg  Tinel's Test - Cubital Tunnel  Neg  Phalen's Test    Neg  Median Nerve Compression Test Neg    ROM hand: unable to make a fist    ROM wrist: about 10 degrees in both directions    ROM elbow: extension to -10, flexion to 120. Supination about 120  degrees.    Abdomen not guarded  Respirations nonlabored  Perfusion intact    Diagnostic Results:     Imaging - I independently viewed the patient's imaging as well as the radiology report.      FINDINGS:  The proximal radius again shows a recent fracture involving the head with mild impaction but no dislocation.  No new fracture detected.  A joint effusion persists.     Impression:     Radial head fracture    EMG - none    ASSESSMENT/PLAN:      58 y.o. yo female with Left impacted radial neck fracture, 5.5 weeks from injury. Continued wrist/hand stiffness due to splinting.    Plan: The patient and I had a thorough discussion today.  We discussed the working diagnosis as well as several other potential alternative diagnoses.  Xrays reviewed in detail with her today. Continue with aggressive OT focusing on hand/wrist/elbow ROM. Plan to start strengthening program in about 2 weeks. RTC in 3 weeks for repeat xrays and motion check.    Should the patient's symptoms worsen, persist, or fail to improve they should return for reevaluation and I would be happy to see them back anytime.           Please do not hesitate to reach out to us via email, phone, or MyChart with any questions, concerns, or feedback.

## 2024-05-20 ENCOUNTER — CLINICAL SUPPORT (OUTPATIENT)
Dept: REHABILITATION | Facility: HOSPITAL | Age: 59
End: 2024-05-20
Payer: COMMERCIAL

## 2024-05-20 DIAGNOSIS — M25.60 RANGE OF MOTION DEFICIT: ICD-10-CM

## 2024-05-20 DIAGNOSIS — M79.89 HAND SWELLING: Primary | ICD-10-CM

## 2024-05-20 PROCEDURE — 97140 MANUAL THERAPY 1/> REGIONS: CPT

## 2024-05-20 PROCEDURE — 97110 THERAPEUTIC EXERCISES: CPT

## 2024-05-20 NOTE — PROGRESS NOTES
[facility]  Occupational Therapy Treatment Note     Date: 5/20/2024  Name: Mikaela Oneil  [facility] Number: [id number]    Therapy Diagnosis:   Encounter Diagnoses   Name Primary?    Hand swelling Yes    Range of motion deficit          Physician: Russo-Digeorge, Jamie L*    Physician Orders: Eval and Treat  Medical Diagnosis: Closed nondisplaced fracture of head of left radius with routine healing, subsequent encounter [S57.125D]   Surgical Procedure and Date: n/a  Onset Date: 3/30/24  Evaluation Date: 4/25/2024  Insurance Authorization Period Expiration: 4/25/25  Plan of Care Certification Period: 6/21/24  Date of Return to MD: 5/8/24  Visit # / Visits authorized: 4 / 20     FOTO: 1/3, FOTO = 40.0168%    Lobby Access Code: [email or web address]      Precautions:  Standard and Weightbearing     Time In: 10:00 AM  Time Out: 11:00 AM  Total Appointment Time (timed & untimed codes): 58 minutes    Subjective     Patient reports: She is not in any pain but has had decreased sleep hygiene due to usually sleeping on her right side, which is painful as the elbow remains in flexion throughout the night.   She was compliant with home exercise program given last session.   Response to previous treatment:sachin well  Functional change: no change this date.     Pain: 0/10  Location: left arms     Objective     Elbow and Wrist ROM. Measured in degrees.    4/25/2024 4/25/2024 5/8/2024     Right Left Left  (Post-treatment)   Elbow Ext/Flex WFL -50/110 -25/133   Supination/Pronation WFL 15/70 67/90   Wrist Ext/Flex WFL 35/28 40/39   Wrist RD/UD WFL        8/20            10/20         Hand ROM. Measured in degrees.  R hand WFL    4/25/2024 5/8/2024     Left  Left  (Post-treatment)          Index: MP  28 40              PIP     77 89              DIP 35 38              MADRGIAL 140 167          Long:  MP 30 40              PIP 40 55              DIP 7 15              MADRIGAL 77 110          Ring:   MP 25 28              PIP 38 41          "     DIP 7 18              MADRIGAL 70 87          Small:  MP 17 39               PIP 25 20               DIP 25 20              MADRIGAL 67 79          Thumb: MP 32 35                IP 52 58       Rad ADD/ABD 38 40       Pal ADD/ABD 40 35          Opposition To tip of IF NT         Treatment     Mikaela received the treatments listed below:      therapeutic exercises to develop ROM and flexibility for 45 minutes including:  - WF/WE x 10 with 1# weight  - Elbow active range of motion 3-ways: x 15 each  - Elbow flex/ext AAROM: x 10 with 5" hold in supine  - FA Sup/pro AAROM x 10 with 5" hold  - FA sup/pro with green therabar as a dowel x 10  - FA active range of motion: sup/pro x 10 (NT)  - Digit abduction/adduction x 10  - Extension lifts x 10  - passive range of motion of digits 2-5x 10 with 5" hold  - [place] carry with 2# x 3'  - LLPS with MH for 8 minutes to left elbow in extension in supine with headboard raised at an angle (semi-fowlers position)      manual therapy techniques: Manual Lymphatic Drainage and Soft tissue Mobilization were applied to the: left hand, arm and elbow for 10 minutes, including:  - Retrograde massage x 10' and STM to dorsal hand    neuromuscular re-education activities to improve: - for 0 minutes. The following activities were included:  NT  therapeutic activities to improve functional performance for 0  minutes, including:  NT    Patient Education and Home Exercises     Education provided:   - Progress towards goals   - K tape technique for improved edema    Written Home Exercises Provided: Patient instructed to cont prior HEP.  Exercises were reviewed and Mikaela was able to demonstrate them prior to the end of the session.  Mikaela demonstrated good  understanding of the home exercise program provided. See electronic medical record under Patient Instructions for exercises provided during therapy sessions.       Assessment     Good progression with more aggressive therapy techniques, including " AAROM and farmers carries. Persistent swelling in the digits and hand. Persistent decreased active range of motion in elbow extension and supination. Therapist to be more aggressive, per PA orders, to gain increased hand, wrist and elbow ROM. Therapist to re-assess objective measurements next session. Therapist to increase resistive activities next session.     Mikaela is progressing well towards her goals and there are no updates to goals at this time. Pt prognosis is Good.     Patient will continue to benefit from skilled outpatient occupational therapy to address the deficits listed in the problem list on initial evaluation provide patient/family education and to maximize patient's level of independence in the home and community environment.     Patient's spiritual, cultural and educational needs considered and patient agreeable to plan of care and goals.    Anticipated barriers to occupational therapy:     Goals:  Long Term Goals (LTGs); to be met by discharge.  1) Pt will report pain no higher than 2/10 with daily tasks and getting back to bowling  2) Pt will report return to prior level of function  3) Pt will demonstrate improved left elbow AROM WFL for performing grooming tasks and other daily activities  4) Pt will demonstrate improved L wrist and hand AROM WFL for grasping and performing daily tasks  5)  strength to be assessed when appropriate at 6 weeks and set goals     Short Term Goals (STGs); to be met within 4 weeks (5/25/24).  1) Pt will report or demonstrate independence with HEP and edema management  2) Pt will report pain no higher than 5/10 with daily tasks  3) FOTO to be assessed at next session and set goals  4) Pt will demonstrate improved left elbow and forearm AROM by at least 20 degrees for improved function with self care  5) Pt will demonstrate improved left digits AROM by at least 30-50 degrees for improved grasp and typing at work.   6) Pt will demonstrate improved edema in left hand  and wrist by at least 0.3-0.5 cm for increased motion and grasp    Plan     Updates/Grading for next session:   Continue OT POC per protocol    Rigoberto Tomas OTR/PATRICIA   5/20/2024

## 2024-05-23 ENCOUNTER — CLINICAL SUPPORT (OUTPATIENT)
Dept: REHABILITATION | Facility: HOSPITAL | Age: 59
End: 2024-05-23
Payer: COMMERCIAL

## 2024-05-23 DIAGNOSIS — M79.89 HAND SWELLING: Primary | ICD-10-CM

## 2024-05-23 DIAGNOSIS — M25.60 RANGE OF MOTION DEFICIT: ICD-10-CM

## 2024-05-23 PROCEDURE — 97110 THERAPEUTIC EXERCISES: CPT

## 2024-05-23 NOTE — PROGRESS NOTES
[facility]  Occupational Therapy Treatment Note     Date: 5/23/2024  Name: Mikaela Oneil  [facility] Number: [id number]    Therapy Diagnosis:   Encounter Diagnoses   Name Primary?    Hand swelling Yes    Range of motion deficit        Physician: Russo-Digeorge, Jamie L*    Physician Orders: Eval and Treat  Medical Diagnosis: Closed nondisplaced fracture of head of left radius with routine healing, subsequent encounter [S5.125D]   Surgical Procedure and Date: n/a  Onset Date: 3/30/24  Evaluation Date: 4/25/2024  Insurance Authorization Period Expiration: 4/25/25  Plan of Care Certification Period: 6/21/24  Date of Return to MD: 5/8/24  Visit # / Visits authorized: 4 / 20     FOTO: 1/3, FOTO = 40.0168%    Lobby Access Code: [email or web address]      Precautions:  Standard and Weightbearing     Time In: 4:00 PM  Time Out: 5::00 PM  Total Appointment Time (timed & untimed codes): 60 minutes    Subjective     Patient reports: She is not in any pain but has had decreased sleep hygiene due to usually sleeping on her right side, which is painful as the elbow remains in flexion throughout the night.   She was compliant with home exercise program given last session.   Response to previous treatment:sachin well  Functional change: no change this date.     Pain: 0/10  Location: left arms     Objective     Elbow and Wrist ROM. Measured in degrees.    4/25/2024 4/25/2024 5/8/2024 5/23/2024     Right Left Left  (Post-treatment) Left  (Post-treatment)   Elbow Ext/Flex WFL -50/110 -25/133 -15/135   Supination/Pronation WFL 15/70 67/90 70/90   Wrist Ext/Flex WFL 35/28 40/39 55/50   Wrist RD/UD WFL        8/20            10/20           10/25         Hand ROM. Measured in degrees.  R hand WFL    4/25/2024 5/8/2024     Left  Left  (Post-treatment)          Index: MP  28 40              PIP     77 89              DIP 35 38              MADRIGAL 140 167          Long:  MP 30 40              PIP 40 55              DIP 7 15               "MADRIGAL 77 110          Ring:   MP 25 28              PIP 38 41              DIP 7 18              MADRIGAL 70 87          Small:  MP 17 39               PIP 25 20               DIP 25 20              MADRIGAL 67 79          Thumb: MP 32 35                IP 52 58       Rad ADD/ABD 38 40       Pal ADD/ABD 40 35          Opposition To tip of IF NT       Treatment     Mikaela received the treatments listed below:      therapeutic exercises to develop ROM and flexibility for 60 minutes including:  - passive range of motion: wrist flexion x 10 with 5" holds  - Intrinsic stretching for digits 2-5 x 10 with 5" holds  - WF/WE/RD/UD x 10 each with 1# weight  - Elbow active range of motion 3-ways: x 15 each (NT)  - FA sup AAROM x 10  - Elbow flex/ext AAROM: x 10 with 5" hold in supine  - FA Sup/pro AAROM x 10 with 5" hold  - FA sup/pro with green therabar as a dowel x 10 (NT)  - WF/WE stretches: 3 x 30" each  - passive range of motion of digits 2-5x 10 with 5" hold  - [place] carry with 3# x 3'  - LLPS with MH for 8 minutes to left elbow in extension in supine with headboard raised at an angle (semi-fowlers position)  - passive EE stretch with MH x 2' with 2#   - objective elbow and forearm active range of motion objective measurements taken this session.     manual therapy techniques: Manual Lymphatic Drainage and Soft tissue Mobilization were applied to the: left hand, arm and elbow for 0 minutes, including:  - Retrograde massage x 10' and STM to dorsal hand    neuromuscular re-education activities to improve: - for 0 minutes. The following activities were included:  NT  therapeutic activities to improve functional performance for 0  minutes, including:  NT    Patient Education and Home Exercises     Education provided:   - Progress towards goals     Written Home Exercises Provided: Patient instructed to cont prior HEP.  Exercises were reviewed and Mikaela was able to demonstrate them prior to the end of the session.  Mikaela demonstrated " good  understanding of the home exercise program provided. See electronic medical record under Patient Instructions for exercises provided during therapy sessions.       Assessment     Good progression with more aggressive therapy techniques, with some minor gains in active range of motion objective measurements this date for the elbow and forearm. Persistent swelling in the digits and hand. The therapist is to re-assess objective hand range of motion and FOTO score next session.     Mikaela is progressing well towards her goals and there are no updates to goals at this time. Pt prognosis is Good.     Patient will continue to benefit from skilled outpatient occupational therapy to address the deficits listed in the problem list on initial evaluation provide patient/family education and to maximize patient's level of independence in the home and community environment.     Patient's spiritual, cultural and educational needs considered and patient agreeable to plan of care and goals.    Anticipated barriers to occupational therapy:     Goals:  Long Term Goals (LTGs); to be met by discharge.  1) Pt will report pain no higher than 2/10 with daily tasks and getting back to bowling  2) Pt will report return to prior level of function  3) Pt will demonstrate improved left elbow AROM WFL for performing grooming tasks and other daily activities  4) Pt will demonstrate improved L wrist and hand AROM WFL for grasping and performing daily tasks  5)  strength to be assessed when appropriate at 6 weeks and set goals     Short Term Goals (STGs); to be met within 4 weeks (5/25/24).  1) Pt will report or demonstrate independence with HEP and edema management. MET  2) Pt will report pain no higher than 5/10 with daily tasks.   3) Pt to score a FOTO of at least 50% for improved elbow use during work and ADL activities.   4) Pt will demonstrate improved left elbow and forearm AROM by at least 20 degrees for improved function with self  care  5) Pt will demonstrate improved left digits AROM by at least 30-50 degrees for improved grasp and typing at work.   6) Pt will demonstrate improved edema in left hand and wrist by at least 0.3-0.5 cm for increased motion and grasp    Plan     Updates/Grading for next session:   Continue OT POC per protocol    Rigoberto Tomas OTR/L   5/23/2024

## 2024-05-28 ENCOUNTER — PATIENT MESSAGE (OUTPATIENT)
Dept: ORTHOPEDICS | Facility: CLINIC | Age: 59
End: 2024-05-28
Payer: COMMERCIAL

## 2024-05-28 RX ORDER — MELOXICAM 15 MG/1
TABLET ORAL
Qty: 30 TABLET | Refills: 0 | Status: SHIPPED | OUTPATIENT
Start: 2024-05-28

## 2024-05-28 NOTE — PROGRESS NOTES
"[facility] OUTPATIENT THERAPY AND WELLNESS  Occupational Therapy Treatment Note     Date: 5/29/2024  Name: Mikaela Oneil  [facility] Number: [id number]    Therapy Diagnosis:   Encounter Diagnoses   Name Primary?    Hand swelling Yes    Range of motion deficit          Physician: Russo-Digeorge, Jamie L*    Physician Orders: Eval and Treat  Medical Diagnosis: Closed nondisplaced fracture of head of left radius with routine healing, subsequent encounter [S59.960D]   Surgical Procedure and Date: n/a  Onset Date: 3/30/24  Evaluation Date: 4/25/2024  Insurance Authorization Period Expiration: 4/25/25  Plan of Care Certification Period: 6/21/24  Date of Return to MD: 5/8/24  Visit # / Visits authorized: 6 / 20     FOTO: 1/3, FOTO = 40.0168%    Lobby Access Code: [email or web address]      Precautions:  Standard and Weightbearing     Time In: 4:54 PM  Time Out: 5:48 PM  Total Appointment Time (timed & untimed codes): 54 minutes    Subjective     Patient reports: "I slept on my L side, my shoulder is hurting but my elbow feels ok   She was compliant with home exercise program given last session.   Response to previous treatment:sachin well  Functional change: no change this date.     Pain: 0/10  Location: left arms     Objective     Elbow and Wrist ROM. Measured in degrees.    4/25/2024 4/25/2024 5/8/2024 5/23/2024     Right Left Left  (Post-treatment) Left  (Post-treatment)   Elbow Ext/Flex WFL -50/110 -25/133 -15/135   Supination/Pronation WFL 15/70 67/90 70/90   Wrist Ext/Flex WFL 35/28 40/39 55/50   Wrist RD/UD WFL        8/20            10/20           10/25         Hand ROM. Measured in degrees.  R hand WFL    4/25/2024 5/8/2024     Left  Left  (Post-treatment)          Index: MP  28 40              PIP     77 89              DIP 35 38              MADRIGAL 140 167          Long:  MP 30 40              PIP 40 55              DIP 7 15              MADRIGAL 77 110          Ring:   MP 25 28              PIP 38 41              DIP 7 18              MADRIGAL 70 87 " "         Small:  MP 17 39               PIP 25 20               DIP 25 20              MADRIGAL 67 79          Thumb: MP 32 35                IP 52 58       Rad ADD/ABD 38 40       Pal ADD/ABD 40 35          Opposition To tip of IF NT       Treatment     Mikaela received the treatments listed below:      therapeutic exercises to develop ROM and flexibility for 54 minutes including:  - LLPS x 5 min with MH, x 3 min holding 2 x DB  - PROM supine, elbow ext 3 ways, wrist flex/ext, and sup  - Elbow flex/ext AAROM 3 ways: x 10 with 5" hold in supine  - elbow AAROM standing against wall with weigthless dowel 3 ways x 10 ea   - WF/WE/RD/UD x 10 each with 1# weight  - FA Sup/pro AAROM with mallet x 10 with 5" hold  - [place] carry with 3# x 3'        manual therapy techniques: Manual Lymphatic Drainage and Soft tissue Mobilization were applied to the: left hand, arm and elbow for 0 minutes, including:  - Retrograde massage x 10' and STM to dorsal hand    neuromuscular re-education activities to improve: - for 0 minutes. The following activities were included:  NT  therapeutic activities to improve functional performance for 0  minutes, including:  NT    Patient Education and Home Exercises     Education provided:   - Progress towards goals     Written Home Exercises Provided: Patient instructed to cont prior HEP.  Exercises were reviewed and Mikaela was able to demonstrate them prior to the end of the session.  Mikaela demonstrated good  understanding of the home exercise program provided. See electronic medical record under Patient Instructions for exercises provided during therapy sessions.       Assessment     Good tolerance to progression of exercises. Will cont to address ROM and strength deficits with aggressive therapy as instructed by MD fink.   Client Care conference completed with evaluating therapist in regards to this patients POC as evidenced by co signature of supervising therapist.       Mikaela is progressing well " towards her goals and there are no updates to goals at this time. Pt prognosis is Good.     Patient will continue to benefit from skilled outpatient occupational therapy to address the deficits listed in the problem list on initial evaluation provide patient/family education and to maximize patient's level of independence in the home and community environment.     Patient's spiritual, cultural and educational needs considered and patient agreeable to plan of care and goals.    Anticipated barriers to occupational therapy:     Goals:  Long Term Goals (LTGs); to be met by discharge.  1) Pt will report pain no higher than 2/10 with daily tasks and getting back to bowling  2) Pt will report return to prior level of function  3) Pt will demonstrate improved left elbow AROM WFL for performing grooming tasks and other daily activities  4) Pt will demonstrate improved L wrist and hand AROM WFL for grasping and performing daily tasks  5)  strength to be assessed when appropriate at 6 weeks and set goals     Short Term Goals (STGs); to be met within 4 weeks (5/25/24).  1) Pt will report or demonstrate independence with HEP and edema management. MET  2) Pt will report pain no higher than 5/10 with daily tasks.   3) Pt to score a FOTO of at least 50% for improved elbow use during work and ADL activities.   4) Pt will demonstrate improved left elbow and forearm AROM by at least 20 degrees for improved function with self care  5) Pt will demonstrate improved left digits AROM by at least 30-50 degrees for improved grasp and typing at work.   6) Pt will demonstrate improved edema in left hand and wrist by at least 0.3-0.5 cm for increased motion and grasp    Plan     Updates/Grading for next session:   Continue OT POC per protocol    CHEMA Cabral/JOANN/PATRICIA   5/29/2024

## 2024-05-29 ENCOUNTER — CLINICAL SUPPORT (OUTPATIENT)
Dept: REHABILITATION | Facility: HOSPITAL | Age: 59
End: 2024-05-29
Payer: COMMERCIAL

## 2024-05-29 DIAGNOSIS — M25.60 RANGE OF MOTION DEFICIT: ICD-10-CM

## 2024-05-29 DIAGNOSIS — M79.89 HAND SWELLING: Primary | ICD-10-CM

## 2024-05-29 PROCEDURE — 97110 THERAPEUTIC EXERCISES: CPT | Mod: CO

## 2024-05-30 ENCOUNTER — OFFICE VISIT (OUTPATIENT)
Dept: ORTHOPEDICS | Facility: CLINIC | Age: 59
End: 2024-05-30
Payer: COMMERCIAL

## 2024-05-30 ENCOUNTER — HOSPITAL ENCOUNTER (OUTPATIENT)
Dept: RADIOLOGY | Facility: HOSPITAL | Age: 59
Discharge: HOME | End: 2024-05-30
Attending: ORTHOPAEDIC SURGERY
Payer: COMMERCIAL

## 2024-05-30 VITALS — WEIGHT: 199.94 LBS | HEIGHT: 67 IN | BODY MASS INDEX: 31.38 KG/M2

## 2024-05-30 DIAGNOSIS — S52.125D CLOSED NONDISPLACED FRACTURE OF HEAD OF LEFT RADIUS WITH ROUTINE HEALING, SUBSEQUENT ENCOUNTER: Primary | ICD-10-CM

## 2024-05-30 DIAGNOSIS — S52.125D CLOSED NONDISPLACED FRACTURE OF HEAD OF LEFT RADIUS WITH ROUTINE HEALING, SUBSEQUENT ENCOUNTER: ICD-10-CM

## 2024-05-30 PROCEDURE — 99999 PR PBB SHADOW E&M-EST. PATIENT-LVL III: CPT | Mod: PBBFAC,,, | Performed by: ORTHOPAEDIC SURGERY

## 2024-05-30 PROCEDURE — 99213 OFFICE O/P EST LOW 20 MIN: CPT | Mod: S$GLB,,, | Performed by: ORTHOPAEDIC SURGERY

## 2024-05-30 PROCEDURE — 3008F BODY MASS INDEX DOCD: CPT | Mod: CPTII,S$GLB,, | Performed by: ORTHOPAEDIC SURGERY

## 2024-05-30 PROCEDURE — 3044F HG A1C LEVEL LT 7.0%: CPT | Mod: CPTII,S$GLB,, | Performed by: ORTHOPAEDIC SURGERY

## 2024-05-30 PROCEDURE — 73080 X-RAY EXAM OF ELBOW: CPT | Mod: TC,LT

## 2024-05-30 PROCEDURE — 1159F MED LIST DOCD IN RCRD: CPT | Mod: CPTII,S$GLB,, | Performed by: ORTHOPAEDIC SURGERY

## 2024-05-30 PROCEDURE — 73080 X-RAY EXAM OF ELBOW: CPT | Mod: 26,LT,, | Performed by: RADIOLOGY

## 2024-05-30 NOTE — PROGRESS NOTES
[facility]  History & Physical  Orthopedics    SUBJECTIVE:      Chief Complaint: Left elbow injury    Referring Provider: No ref. provider found     History of Present Illness:  Patient is a 59 y.o. right hand dominant female who presents today with complaints of left elbow injury occurred on 3/30/24 when she tripped/fell. She was seen in the ED initially and splinted for radial neck fracture. She then saw Orthopedics and was transitioned to long arm splint which was maintained for 2 weeks. She reports 9/10 pain, takes Tylenol for pain. Swelling and limited ROM of the arm.     Interval History 5/8/24: the patient is now 5.5 weeks out from left elbow radial neck fracture. She was referred to OT for aggressive ROM of the elbow, and has gone to several visits since her initial visit with me. She is doing much better, swelling has improved as well as motion. She does take the mobic daily.    Interval History 5/30/24: The patient returns to clinic. She is now 8.5 weeks out from left elbow radial neck fracture. She reports she has been working with OT for aggressive motion and ROM of the elbow. She notes good improvement to her swelling. She has completed her course of mobic. Her pain is a 0/10 today.  She does note some persistent stiffness in her left hand but this is improving.  She notes that she can now type at work where as this was a relatively recent development.    Onset of symptoms/DOI was 3/30/24.    Symptoms are aggravated by activity and movement.    Symptoms are alleviated by rest.    Symptoms consist of pain, swelling, and decreased ROM.    The patient rates their pain as a 0/10    Attempted treatment(s) and/or interventions include activity modifications, rest, splinting/casting, Mobic, OT.     The patient denies any fevers, chills, N/V, D/C and presents for evaluation.       No past medical history on file.  Past Surgical History:   Procedure Laterality Date    gastric sleeve       "HYSTERECTOMY      INCONTINENCE SURGERY      THYROID SURGERY       Review of patient's allergies indicates:   Allergen Reactions    Azithromycin Nausea Only     Social History     Social History Narrative    Not on file     Family History   Problem Relation Name Age of Onset    Cancer Paternal Grandmother      Cancer Father      Diabetes Mother      Breast cancer Paternal Aunt       labor Daughter           Current Outpatient Medications:     meloxicam (MOBIC) 15 MG tablet, TAKE 1 TABLET(15 MG) BY MOUTH DAILY, Disp: 30 tablet, Rfl: 0      Review of Systems:  Constitutional: no fever or chills  Eyes: no visual changes  ENT: no nasal congestion or sore throat  Respiratory: no cough or shortness of breath  Cardiovascular: no chest pain  Gastrointestinal: no nausea or vomiting, tolerating diet  Musculoskeletal: pain, soreness, and decreased ROM    OBJECTIVE:      Vital Signs (Most Recent):  Vitals:    24 1125   Weight: 90.7 kg (199 lb 15.3 oz)   Height: 5' 7" (1.702 m)     Body mass index is 31.32 kg/m².      Physical Exam:  Constitutional: The patient appears well-developed and well-nourished. No distress.   Skin: No lesions appreciated  Head: Normocephalic and atraumatic.   Nose: Nose normal.   Ears: No deformities seen  Eyes: Conjunctivae and EOM are normal.   Neck: No tracheal deviation present.   Cardiovascular: Normal rate and intact distal pulses.    Pulmonary/Chest: Effort normal. No respiratory distress.   Abdominal: There is no guarding.   Neurological: The patient is alert.   Psychiatric: The patient has a normal mood and affect.     Left Elbow/Hand/Wrist Examination:    Observation/Inspection:  Swelling  Generalized left edema hand/wrist -mild    Deformity  none  Discoloration  none     Scars   none    Atrophy  none    HAND/WRIST EXAMINATION:  Finkelstein's Test   Neg  WHAT Test    Neg  Snuff box tenderness   Neg  Abbott's Test    Neg  Hook of [place]ate Tenderness  Neg  CMC " grind    Neg  Circumduction test   Neg    Neurovascular Exam:  Digits WWP, brisk CR < 3s throughout  NVI motor/LTS to M/R/U nerves, radial pulse 2+  Tinel's Test - Carpal Tunnel  Neg  Tinel's Test - Cubital Tunnel  Neg  Phalen's Test    Neg  Median Nerve Compression Test Neg    ROM hand: unable to make a full fist. Opposition full.     ROM wrist: about 10 degrees in both directions    ROM elbow: extension to -10, flexion to 140. Supination and pronation are full and painless    Abdomen not guarded  Respirations nonlabored  Perfusion intact    Diagnostic Results:     Imaging - I independently viewed the patient's imaging as well as the radiology report.      Left elbow x-rays demonstrate a healed radial head/neck fracture with no complications       ASSESSMENT/PLAN:      59 y.o. yo female with healed/healing Left impacted radial neck fracture and left hand stiffness    Plan: The patient and I had a thorough discussion today.  We discussed the working diagnosis as well as several other potential alternative diagnoses.  Treatment options were discussed, both conservative and surgical.  Conservative treatment options would include things such as activity modifications, anti-inflammatory medications, occupational therapy, splinting/bracing, corticosteroid injections, and others.  Surgical options were discussed as well.    At this point in time, Mikaela's elbow is doing great.  X-rays look stable and she has regained nearly full painless motion there.  Her left hand remains stiff, however.  This is improving with time and therapy.  I will message her therapy team to put more focused on aggressive left hand stretching and strengthening at this point in time and moving forward.  Follow up with me in 4 weeks with new left hand and elbow x-rays for re-evaluation or sooner for any problems.

## 2024-06-03 ENCOUNTER — CLINICAL SUPPORT (OUTPATIENT)
Dept: REHABILITATION | Facility: HOSPITAL | Age: 59
End: 2024-06-03
Payer: COMMERCIAL

## 2024-06-03 DIAGNOSIS — M25.60 RANGE OF MOTION DEFICIT: ICD-10-CM

## 2024-06-03 DIAGNOSIS — M79.89 HAND SWELLING: Primary | ICD-10-CM

## 2024-06-03 PROCEDURE — 97110 THERAPEUTIC EXERCISES: CPT | Mod: CO

## 2024-06-03 NOTE — PROGRESS NOTES
"[facility]  Occupational Therapy Treatment Note     Date: 6/3/2024  Name: Mikaela Oneil  [facility] Number: [id number]    Therapy Diagnosis:   Encounter Diagnoses   Name Primary?    Hand swelling Yes    Range of motion deficit            Physician: Russo-Digeorge, Jamie L*    Physician Orders: Eval and Treat  Medical Diagnosis: Closed nondisplaced fracture of head of left radius with routine healing, subsequent encounter [B83.775S]   Surgical Procedure and Date: n/a  Onset Date: 3/30/24  Evaluation Date: 4/25/2024  Insurance Authorization Period Expiration: 4/25/25  Plan of Care Certification Period: 6/21/24  Date of Return to MD: 5/8/24  Visit # / Visits authorized: 7 / 20     FOTO: 1/3, FOTO = [phone]%    Lobby Access Code: [email or web address]      Precautions:  Standard and Weightbearing     Time In: 5 PM  Time Out: 5:55 PM  Total Appointment Time (timed & untimed codes): 55 minutes    Subjective     Patient reports: "The doctor wants us to shift focus."  She was compliant with home exercise program given last session.   Response to previous treatment:sachin well  Functional change: no change this date.     Pain: 0/10  Location: left arms     Objective     Elbow and Wrist ROM. Measured in degrees.    4/25/2024 4/25/2024 5/8/2024 5/23/2024     Right Left Left  (Post-treatment) Left  (Post-treatment)   Elbow Ext/Flex WFL -50/110 -25/133 -15/135   Supination/Pronation WFL 15/70 67/90 70/90   Wrist Ext/Flex WFL 35/28 40/39 55/50   Wrist RD/UD WFL        8/20            10/20           10/25         Hand ROM. Measured in degrees.  R hand WFL    4/25/2024 5/8/2024     Left  Left  (Post-treatment)          Index: MP  28 40              PIP     77 89              DIP 35 38              MADRIGAL 140 167          Long:  MP 30 40              PIP 40 55              DIP 7 15              MADRIGAL 77 110          Ring:   MP 25 28              PIP 38 41              DIP 7 18              MADRIGAL 70 87          Small:  MP 17 39         " "      PIP 25 20               DIP 25 20              MADRIGAL 67 79          Thumb: MP 32 35                IP 52 58       Rad ADD/ABD 38 40       Pal ADD/ABD 40 35          Opposition To tip of IF NT       Treatment     Mikaela received the treatments listed below:      therapeutic exercises to develop ROM and flexibility for 55 minutes including:  - LLPS x 5 min with MH, x 5 min holding 2 x DB  - PROM supine, elbow ext 3 ways, wrist flex/ext, and sup  - Elbow flex/ext AAROM 3 ways: x 10 with 5" hold in supine  - elbow AAROM standing against wall with weigthless dowel 3 ways x 10 ea   - WF/WE/RD/UD x 10 each with 3# weight  - FA Sup/pro AAROM with mallet x 10 with 5" hold  - Longoria carry with 3# x 3'        manual therapy techniques: Manual Lymphatic Drainage and Soft tissue Mobilization were applied to the: left hand, arm and elbow for 0 minutes, including:  - Retrograde massage x 10' and STM to dorsal hand    neuromuscular re-education activities to improve: - for 0 minutes. The following activities were included:  NT  therapeutic activities to improve functional performance for 0  minutes, including:  NT    Patient Education and Home Exercises     Education provided:   - Progress towards goals     Written Home Exercises Provided: Patient instructed to cont prior HEP.  Exercises were reviewed and Mikaela was able to demonstrate them prior to the end of the session.  Mikaela demonstrated good  understanding of the home exercise program provided. See electronic medical record under Patient Instructions for exercises provided during therapy sessions.       Assessment     Good tolerance to progression of exercises. Will cont to address ROM and strength deficits with aggressive therapy as instructed by MD fink.     Mikaela is progressing well towards her goals and there are no updates to goals at this time. Pt prognosis is Good.     Patient will continue to benefit from skilled outpatient occupational therapy to address the " deficits listed in the problem list on initial evaluation provide patient/family education and to maximize patient's level of independence in the home and community environment.     Patient's spiritual, cultural and educational needs considered and patient agreeable to plan of care and goals.    Anticipated barriers to occupational therapy:     Goals:  Long Term Goals (LTGs); to be met by discharge.  1) Pt will report pain no higher than 2/10 with daily tasks and getting back to bowling  2) Pt will report return to prior level of function  3) Pt will demonstrate improved left elbow AROM WFL for performing grooming tasks and other daily activities  4) Pt will demonstrate improved L wrist and hand AROM WFL for grasping and performing daily tasks  5)  strength to be assessed when appropriate at 6 weeks and set goals     Short Term Goals (STGs); to be met within 4 weeks (5/25/24).  1) Pt will report or demonstrate independence with HEP and edema management. MET  2) Pt will report pain no higher than 5/10 with daily tasks.   3) Pt to score a FOTO of at least 50% for improved elbow use during work and ADL activities.   4) Pt will demonstrate improved left elbow and forearm AROM by at least 20 degrees for improved function with self care  5) Pt will demonstrate improved left digits AROM by at least 30-50 degrees for improved grasp and typing at work.   6) Pt will demonstrate improved edema in left hand and wrist by at least 0.3-0.5 cm for increased motion and grasp    Plan     Updates/Grading for next session:   Continue OT POC per protocol    CHEMA Cabral/JOANN/PATRICIA   6/3/2024

## 2024-08-01 ENCOUNTER — HOSPITAL ENCOUNTER (OUTPATIENT)
Dept: RADIOLOGY | Facility: OTHER | Age: 59
Discharge: HOME | End: 2024-08-01
Payer: COMMERCIAL

## 2024-08-01 ENCOUNTER — OFFICE VISIT (OUTPATIENT)
Dept: ORTHOPEDICS | Facility: CLINIC | Age: 59
End: 2024-08-01
Payer: COMMERCIAL

## 2024-08-01 VITALS — HEIGHT: 67 IN | BODY MASS INDEX: 31.38 KG/M2 | WEIGHT: 199.94 LBS

## 2024-08-01 DIAGNOSIS — M65.4 DE QUERVAIN'S TENOSYNOVITIS, RIGHT: Primary | ICD-10-CM

## 2024-08-01 DIAGNOSIS — M79.644 PAIN OF RIGHT THUMB: Primary | ICD-10-CM

## 2024-08-01 DIAGNOSIS — M79.644 PAIN OF RIGHT THUMB: ICD-10-CM

## 2024-08-01 PROCEDURE — 73140 X-RAY EXAM OF FINGER(S): CPT | Mod: 26,RT,, | Performed by: RADIOLOGY

## 2024-08-01 PROCEDURE — 73140 X-RAY EXAM OF FINGER(S): CPT | Mod: TC,FY,RT

## 2024-08-01 PROCEDURE — 99999 PR PBB SHADOW E&M-EST. PATIENT-LVL III: CPT | Mod: PBBFAC,,,

## 2024-08-01 RX ADMIN — DEXAMETHASONE SODIUM PHOSPHATE 4 MG: 4 INJECTION, SOLUTION INTRA-ARTICULAR; INTRALESIONAL; INTRAMUSCULAR; INTRAVENOUS; SOFT TISSUE at 03:08

## 2024-08-01 RX ADMIN — LIDOCAINE HYDROCHLORIDE 1 ML: 10 INJECTION INFILTRATION; PERINEURAL at 03:08

## 2024-08-01 NOTE — PROCEDURES
Tendon Sheath right DE q 1    Date/Time: 8/1/2024 3:30 PM    Performed by: Kathrine Rivera NP  Authorized by: Kathrine Rivera NP    Consent Done?:  Yes (Verbal)  Indications:  Pain  Site marked: the procedure site was marked    Timeout: prior to procedure the correct patient, procedure, and site was verified    Prep: patient was prepped and draped in usual sterile fashion      Local anesthesia used?: Yes    Anesthesia method: Topical cold spray used prior to the injection and 1cc 1% plain lidocaine contained in the injectate.  Local anesthetic:  Lidocaine 1% without epinephrine  Anesthetic total (ml):  1    Location:  Wrist  Site:  R first doral compartment  Ultrasonic guidance for needle placement?: No    Needle size:  25 G  Approach:  Radial  Medications:  1 mL LIDOcaine HCL 10 mg/ml (1%) 10 mg/mL (1 %); 4 mg dexAMETHasone 4 mg/mL  Patient tolerance:  Patient tolerated the procedure well with no immediate complications

## 2024-08-01 NOTE — PROGRESS NOTES
"[facility]  History & Physical  Orthopedics    SUBJECTIVE:      Chief Complaint:  Right thumb pain    Referring Provider: Jia Kim Dr. is the supervising physician for this encounter/patient  2024  History of Present Illness:  Patient is a 59 y.o. right hand dominant female who presents today with complaints of sudden right thumb pain which began yesterday (last night) when grabbing her mouse. Reports issues with gripping and difficulty writing and discomfort when bending right wrist.  She denies any numbness or tingling.  She reports a prior history of a fall on her left side with a radial head fracture.  She denies any fall on right-hand.     The patient is a/an .  The patient denies any fevers, chills, N/V, D/C and presents for evaluation.       History reviewed. No pertinent past medical history.  Past Surgical History:   Procedure Laterality Date    gastric sleeve      HYSTERECTOMY      INCONTINENCE SURGERY      THYROID SURGERY       Review of patient's allergies indicates:   Allergen Reactions    Azithromycin Nausea Only     Social History     Social History Narrative    Not on file     Family History   Problem Relation Name Age of Onset    Cancer Paternal Grandmother      Cancer Father      Diabetes Mother      Breast cancer Paternal Aunt       labor Daughter           Current Outpatient Medications:     meloxicam (MOBIC) 15 MG tablet, TAKE 1 TABLET(15 MG) BY MOUTH DAILY, Disp: 30 tablet, Rfl: 0      Review of Systems:  Constitutional: no fever or chills  Eyes: no visual changes  ENT: no nasal congestion or sore throat  Respiratory: no cough or shortness of breath  Cardiovascular: no chest pain  Gastrointestinal: no nausea or vomiting, tolerating diet  Musculoskeletal: pain, soreness, and decreased ROM    OBJECTIVE:      Vital Signs (Most Recent):  Vitals:    24 1547   Weight: 90.7 kg (199 lb 15.3 oz)   Height: 5' 7" (1.702 m)     Body mass " index is 31.32 kg/m².      Physical Exam:  Constitutional: The patient appears well-developed and well-nourished. No distress.   Skin: No lesions appreciated  Head: Normocephalic and atraumatic.   Nose: Nose normal.   Ears: No deformities seen  Eyes: Conjunctivae and EOM are normal.   Neck: No tracheal deviation present.   Cardiovascular: Normal rate and intact distal pulses.    Pulmonary/Chest: Effort normal. No respiratory distress.   Abdominal: There is no guarding.   Neurological: The patient is alert.   Psychiatric: The patient has a normal mood and affect.     Right Hand/Wrist Examination:    Observation/Inspection:  Swelling  Mild right 1st compartment, radial aspect of wrist     Deformity  none  Discoloration  none     Scars   none    Atrophy  none  +TTP right thumb bases  HAND/WRIST EXAMINATION:  Finkelstein's Test             Pos right   WHAT Test    Pos right   Snuff box tenderness   Neg  Abbott's Test    Neg  Hook of Hamate Tenderness  Neg  CMC grind    Neg  Circumduction test   Neg    Neurovascular Exam:  Digits WWP, brisk CR < 3s throughout  NVI motor/LTS to M/R/U nerves, radial pulse 2+      ROM hand full, painless    ROM wrist full, painless    ROM elbow full, painless    Abdomen not guarded  Respirations nonlabored  Perfusion intact    Diagnostic Results:   XRAY right thumb 08/02/2024  FINDINGS:  Finger three views right.     No fracture, dislocation, or bone destruction seen.          Imaging - I independently viewed the patient's imaging as well as the radiology report.    EMG - none     ASSESSMENT/PLAN:        Mikaela was seen today for finger pain.    Diagnoses and all orders for this visit:    De Quervain's tenosynovitis, right  -     Tendon Sheath right DE q 1       59 y.o. yo female with rigth de Quervains  Plan: The patient and I had a thorough discussion today.  We discussed the working diagnosis as well as several other potential alternative diagnoses.  Treatment options were discussed, both  conservative and surgical.  Conservative treatment options would include things such as activity modifications, workplace modifications, a period of rest, oral vs topical OTC and prescription anti-inflammatory medications, occupational therapy, splinting/bracing, immobilization, corticosteroid injections, and others.  Surgical options were discussed as well.     At this time, the patient would like to proceed with a trial of right DE Q CSI and thumb spica brace at night. NSAIDS/diclofenac creme as needed for pain.  She will FU in 6 weeks. .    Should the patient's symptoms worsen, persist, or fail to improve they should return for reevaluation and I would be happy to see them back anytime.      Kathrine Rivera, LEODAN  [facility]      Please do not hesitate to reach out to us via email, phone, or MyChart with any questions, concerns, or feedback.

## 2024-08-02 RX ORDER — DEXAMETHASONE SODIUM PHOSPHATE 4 MG/ML
4 INJECTION, SOLUTION INTRA-ARTICULAR; INTRALESIONAL; INTRAMUSCULAR; INTRAVENOUS; SOFT TISSUE
Status: DISCONTINUED | OUTPATIENT
Start: 2024-08-01 | End: 2024-08-02 | Stop reason: HOSPADM

## 2024-08-02 RX ORDER — LIDOCAINE HYDROCHLORIDE 10 MG/ML
1 INJECTION INFILTRATION; PERINEURAL
Status: DISCONTINUED | OUTPATIENT
Start: 2024-08-01 | End: 2024-08-02 | Stop reason: HOSPADM

## 2024-08-13 ENCOUNTER — OFFICE VISIT (OUTPATIENT)
Dept: ORTHOPEDICS | Facility: CLINIC | Age: 59
End: 2024-08-13
Payer: COMMERCIAL

## 2024-08-13 ENCOUNTER — HOSPITAL ENCOUNTER (OUTPATIENT)
Dept: RADIOLOGY | Facility: HOSPITAL | Age: 59
Discharge: HOME | End: 2024-08-13
Attending: ORTHOPAEDIC SURGERY
Payer: COMMERCIAL

## 2024-08-13 VITALS — BODY MASS INDEX: 31.38 KG/M2 | WEIGHT: 199.94 LBS | HEIGHT: 67 IN

## 2024-08-13 DIAGNOSIS — M25.512 CHRONIC LEFT SHOULDER PAIN: ICD-10-CM

## 2024-08-13 DIAGNOSIS — S52.125D CLOSED NONDISPLACED FRACTURE OF HEAD OF LEFT RADIUS WITH ROUTINE HEALING, SUBSEQUENT ENCOUNTER: Primary | ICD-10-CM

## 2024-08-13 DIAGNOSIS — M25.522 LEFT ELBOW PAIN: ICD-10-CM

## 2024-08-13 DIAGNOSIS — S52.125D CLOSED NONDISPLACED FRACTURE OF HEAD OF LEFT RADIUS WITH ROUTINE HEALING, SUBSEQUENT ENCOUNTER: ICD-10-CM

## 2024-08-13 DIAGNOSIS — G89.29 CHRONIC LEFT SHOULDER PAIN: ICD-10-CM

## 2024-08-13 PROCEDURE — 99213 OFFICE O/P EST LOW 20 MIN: CPT | Mod: S$GLB,,, | Performed by: ORTHOPAEDIC SURGERY

## 2024-08-13 PROCEDURE — 99999 PR PBB SHADOW E&M-EST. PATIENT-LVL III: CPT | Mod: PBBFAC,,, | Performed by: ORTHOPAEDIC SURGERY

## 2024-08-13 PROCEDURE — 3008F BODY MASS INDEX DOCD: CPT | Mod: CPTII,S$GLB,, | Performed by: ORTHOPAEDIC SURGERY

## 2024-08-13 PROCEDURE — 73080 X-RAY EXAM OF ELBOW: CPT | Mod: TC,LT

## 2024-08-13 PROCEDURE — 3044F HG A1C LEVEL LT 7.0%: CPT | Mod: CPTII,S$GLB,, | Performed by: ORTHOPAEDIC SURGERY

## 2024-08-13 PROCEDURE — 73080 X-RAY EXAM OF ELBOW: CPT | Mod: 26,LT,, | Performed by: RADIOLOGY

## 2024-08-13 PROCEDURE — 1159F MED LIST DOCD IN RCRD: CPT | Mod: CPTII,S$GLB,, | Performed by: ORTHOPAEDIC SURGERY

## 2024-08-13 NOTE — PROGRESS NOTES
[facility]  History & Physical  Orthopedics    SUBJECTIVE:      Chief Complaint: Left elbow injury    Referring Provider: No ref. provider found     History of Present Illness:  Patient is a 59 y.o. right hand dominant female who presents today with complaints of left elbow injury occurred on 3/30/24 when she tripped/fell. She was seen in the ED initially and splinted for radial neck fracture. She then saw Orthopedics and was transitioned to long arm splint which was maintained for 2 weeks. She reports 9/10 pain, takes Tylenol for pain. Swelling and limited ROM of the arm.     Interval History 5/8/24: the patient is now 5.5 weeks out from left elbow radial neck fracture. She was referred to OT for aggressive ROM of the elbow, and has gone to several visits since her initial visit with me. She is doing much better, swelling has improved as well as motion. She does take the mobic daily.    Interval History 5/30/24: The patient returns to clinic. She is now 8.5 weeks out from left elbow radial neck fracture. She reports she has been working with OT for aggressive motion and ROM of the elbow. She notes good improvement to her swelling. She has completed her course of mobic. Her pain is a 0/10 today.  She does note some persistent stiffness in her left hand but this is improving.  She notes that she can now type at work where as this was a relatively recent development.      Interval history August 13, 2024: The patient returns today for re-evaluation.  She was able to do a limited amount of OT.  She notes some mild stiffness and left upper extremity but left shoulder pain is now her biggest concern.  No other or new complaints she returns for re-evaluation.  She is being seen and treated in the [facility] [place] [facility] clinic for right de Quervain.    Onset of symptoms/DOI was 3/30/24.    Symptoms are aggravated by activity and movement.    Symptoms are alleviated by rest.    Symptoms consist of pain, swelling,  "and decreased ROM.    The patient rates their pain as a 0/10    Attempted treatment(s) and/or interventions include activity modifications, rest, splinting/casting, Mobic, OT.     The patient denies any fevers, chills, N/V, D/C and presents for evaluation.       No past medical history on file.  Past Surgical History:   Procedure Laterality Date    gastric sleeve      HYSTERECTOMY      INCONTINENCE SURGERY      THYROID SURGERY       Review of patient's allergies indicates:   Allergen Reactions    Azithromycin Nausea Only     Social History     Social History Narrative    Not on file     Family History   Problem Relation Name Age of Onset    Cancer Paternal Grandmother      Cancer Father      Diabetes Mother      Breast cancer Paternal Aunt       labor Daughter           Current Outpatient Medications:     meloxicam (MOBIC) 15 MG tablet, TAKE 1 TABLET(15 MG) BY MOUTH DAILY, Disp: 30 tablet, Rfl: 0      Review of Systems:  Constitutional: no fever or chills  Eyes: no visual changes  ENT: no nasal congestion or sore throat  Respiratory: no cough or shortness of breath  Cardiovascular: no chest pain  Gastrointestinal: no nausea or vomiting, tolerating diet  Musculoskeletal: pain, soreness, and decreased ROM    OBJECTIVE:      Vital Signs (Most Recent):  Vitals:    24 0852   Weight: 90.7 kg (199 lb 15.3 oz)   Height: 5' 7" (1.702 m)     Body mass index is 31.32 kg/m².      Physical Exam:  Constitutional: The patient appears well-developed and well-nourished. No distress.   Skin: No lesions appreciated  Head: Normocephalic and atraumatic.   Nose: Nose normal.   Ears: No deformities seen  Eyes: Conjunctivae and EOM are normal.   Neck: No tracheal deviation present.   Cardiovascular: Normal rate and intact distal pulses.    Pulmonary/Chest: Effort normal. No respiratory distress.   Abdominal: There is no guarding.   Neurological: The patient is alert.   Psychiatric: The patient has a normal mood and affect. "     Left Elbow/Hand/Wrist Examination:    Observation/Inspection:  Swelling    None today    Deformity  none  Discoloration  none     Scars   none    Atrophy  none    HAND/WRIST EXAMINATION:  Finkelstein's Test   Neg  WHAT Test    Neg  Snuff box tenderness   Neg  Abbott's Test    Neg  Hook of Hamate Tenderness  Neg  CMC grind    Neg  Circumduction test   Neg    Neurovascular Exam:  Digits WWP, brisk CR < 3s throughout  NVI motor/LTS to M/R/U nerves, radial pulse 2+  Tinel's Test - Carpal Tunnel  Neg  Tinel's Test - Cubital Tunnel  Neg  Phalen's Test    Neg  Median Nerve Compression Test Neg    ROM hand:  the patient can make a fist today    ROM wrist:  nearly full and painless    ROM elbow: extension to -10, flexion to 140.  Supination is to 60 and painless, pronation is to 75 and painless    Abdomen not guarded  Respirations nonlabored  Perfusion intact    Diagnostic Results:     Imaging - I independently viewed the patient's imaging as well as the radiology report.      Left elbow x-rays demonstrate a healed radial head/neck fracture with no complications       ASSESSMENT/PLAN:      59 y.o. yo female with healed left impacted radial neck fracture and mild stiffness left upper extremity, improving, left shoulder pain    Plan: The patient and I had a thorough discussion today.  We discussed the working diagnosis as well as several other potential alternative diagnoses.  Treatment options were discussed, both conservative and surgical.  Conservative treatment options would include things such as activity modifications, anti-inflammatory medications, occupational therapy, splinting/bracing, corticosteroid injections, and others.  Surgical options were discussed as well.    At this point in time, Claudias elbow  fracture has healed.  She does have some mild persistent stiffness but is unable to attend and she declines further OT.  As such, I recommend aggressive home exercises as tolerated without restrictions.  I would  like to refer her for evaluation of her left shoulder. She may follow up with me as needed.  No restrictions.

## 2024-08-16 ENCOUNTER — PATIENT MESSAGE (OUTPATIENT)
Dept: ORTHOPEDICS | Facility: CLINIC | Age: 59
End: 2024-08-16
Payer: COMMERCIAL